# Patient Record
Sex: FEMALE | Race: BLACK OR AFRICAN AMERICAN | Employment: UNEMPLOYED | URBAN - METROPOLITAN AREA
[De-identification: names, ages, dates, MRNs, and addresses within clinical notes are randomized per-mention and may not be internally consistent; named-entity substitution may affect disease eponyms.]

---

## 2020-04-01 PROBLEM — O09.32 LATE PRENATAL CARE IN SECOND TRIMESTER: Status: ACTIVE | Noted: 2020-04-01

## 2020-04-01 LAB
CHLAMYDIA, EXTERNAL: NEGATIVE
HBSAG, EXTERNAL: NEGATIVE
HIV, EXTERNAL: NEGATIVE
N. GONORRHEA, EXTERNAL: NEGATIVE
RPR, EXTERNAL: NORMAL
RUBELLA, EXTERNAL: NORMAL
TYPE, ABO & RH, EXTERNAL: NORMAL

## 2020-04-06 PROBLEM — D58.2 HEMOGLOBIN C TRAIT (HCC): Status: ACTIVE | Noted: 2020-04-06

## 2020-06-25 LAB — GRBS, EXTERNAL: NORMAL

## 2020-06-28 ENCOUNTER — HOSPITAL ENCOUNTER (INPATIENT)
Age: 20
LOS: 2 days | Discharge: HOME | DRG: 560 | End: 2020-06-30
Attending: OBSTETRICS & GYNECOLOGY | Admitting: OBSTETRICS & GYNECOLOGY
Payer: MEDICAID

## 2020-06-28 PROBLEM — O47.9 UTERINE CONTRACTIONS DURING PREGNANCY: Status: ACTIVE | Noted: 2020-06-28

## 2020-06-28 LAB
ABO + RH BLD: NORMAL
ALBUMIN SERPL-MCNC: 3.2 G/DL (ref 3.4–5)
ALBUMIN/GLOB SERPL: 0.7 {RATIO} (ref 0.8–1.7)
ALP SERPL-CCNC: 201 U/L (ref 45–117)
ALT SERPL-CCNC: 42 U/L (ref 13–56)
ANION GAP SERPL CALC-SCNC: 11 MMOL/L (ref 3–18)
APPEARANCE UR: ABNORMAL
APPEARANCE UR: ABNORMAL
AST SERPL-CCNC: 40 U/L (ref 10–38)
BASOPHILS # BLD: 0 K/UL (ref 0–0.1)
BASOPHILS NFR BLD: 0 % (ref 0–2)
BILIRUB SERPL-MCNC: 0.5 MG/DL (ref 0.2–1)
BILIRUB UR QL: NEGATIVE
BILIRUB UR QL: NEGATIVE
BLOOD GROUP ANTIBODIES SERPL: NORMAL
BUN SERPL-MCNC: 10 MG/DL (ref 7–18)
BUN/CREAT SERPL: 18 (ref 12–20)
CALCIUM SERPL-MCNC: 9.5 MG/DL (ref 8.5–10.1)
CHLORIDE SERPL-SCNC: 104 MMOL/L (ref 100–111)
CO2 SERPL-SCNC: 20 MMOL/L (ref 21–32)
COLOR UR: YELLOW
COLOR UR: YELLOW
CREAT SERPL-MCNC: 0.55 MG/DL (ref 0.6–1.3)
DIFFERENTIAL METHOD BLD: ABNORMAL
EOSINOPHIL # BLD: 0 K/UL (ref 0–0.4)
EOSINOPHIL NFR BLD: 0 % (ref 0–5)
ERYTHROCYTE [DISTWIDTH] IN BLOOD BY AUTOMATED COUNT: 14 % (ref 11.6–14.5)
GLOBULIN SER CALC-MCNC: 4.7 G/DL (ref 2–4)
GLUCOSE SERPL-MCNC: 87 MG/DL (ref 74–99)
GLUCOSE UR QL STRIP.AUTO: NEGATIVE MG/DL
GLUCOSE UR QL STRIP.AUTO: NEGATIVE MG/DL
HCT VFR BLD AUTO: 39.5 % (ref 35–45)
HGB BLD-MCNC: 14.2 G/DL (ref 12–16)
KETONES UR-MCNC: NEGATIVE MG/DL
KETONES UR-MCNC: NEGATIVE MG/DL
LDH SERPL L TO P-CCNC: 273 U/L (ref 81–234)
LEUKOCYTE ESTERASE UR QL STRIP: ABNORMAL
LEUKOCYTE ESTERASE UR QL STRIP: ABNORMAL
LYMPHOCYTES # BLD: 1.8 K/UL (ref 0.9–3.6)
LYMPHOCYTES NFR BLD: 10 % (ref 21–52)
MCH RBC QN AUTO: 29.7 PG (ref 24–34)
MCHC RBC AUTO-ENTMCNC: 35.9 G/DL (ref 31–37)
MCV RBC AUTO: 82.6 FL (ref 74–97)
MONOCYTES # BLD: 0.8 K/UL (ref 0.05–1.2)
MONOCYTES NFR BLD: 5 % (ref 3–10)
NEUTS SEG # BLD: 15.4 K/UL (ref 1.8–8)
NEUTS SEG NFR BLD: 85 % (ref 40–73)
NITRITE UR QL: NEGATIVE
NITRITE UR QL: NEGATIVE
PH UR: 7 [PH] (ref 5–9)
PH UR: 7 [PH] (ref 5–9)
PLATELET # BLD AUTO: 298 K/UL (ref 135–420)
PMV BLD AUTO: 11.1 FL (ref 9.2–11.8)
POTASSIUM SERPL-SCNC: 4.2 MMOL/L (ref 3.5–5.5)
PROT SERPL-MCNC: 7.9 G/DL (ref 6.4–8.2)
PROT UR QL: 30 MG/DL
PROT UR QL: 30 MG/DL
RBC # BLD AUTO: 4.78 M/UL (ref 4.2–5.3)
RBC # UR STRIP: ABNORMAL /UL
RBC # UR STRIP: ABNORMAL /UL
SERVICE CMNT-IMP: ABNORMAL
SERVICE CMNT-IMP: ABNORMAL
SODIUM SERPL-SCNC: 135 MMOL/L (ref 136–145)
SP GR UR: 1.02 (ref 1–1.02)
SP GR UR: 1.02 (ref 1–1.02)
SPECIMEN EXP DATE BLD: NORMAL
URATE SERPL-MCNC: 4.8 MG/DL (ref 2.6–7.2)
UROBILINOGEN UR QL: 0.2 EU/DL (ref 0.2–1)
UROBILINOGEN UR QL: 0.2 EU/DL (ref 0.2–1)
WBC # BLD AUTO: 18.1 K/UL (ref 4.6–13.2)

## 2020-06-28 PROCEDURE — 75410000000 HC DELIVERY VAGINAL/SINGLE

## 2020-06-28 PROCEDURE — 74011000258 HC RX REV CODE- 258: Performed by: OBSTETRICS & GYNECOLOGY

## 2020-06-28 PROCEDURE — 80053 COMPREHEN METABOLIC PANEL: CPT

## 2020-06-28 PROCEDURE — 75410000002 HC LABOR FEE PER 1 HR

## 2020-06-28 PROCEDURE — 86900 BLOOD TYPING SEROLOGIC ABO: CPT

## 2020-06-28 PROCEDURE — 81003 URINALYSIS AUTO W/O SCOPE: CPT

## 2020-06-28 PROCEDURE — 83615 LACTATE (LD) (LDH) ENZYME: CPT

## 2020-06-28 PROCEDURE — 84550 ASSAY OF BLOOD/URIC ACID: CPT

## 2020-06-28 PROCEDURE — 4A1HXCZ MONITORING OF PRODUCTS OF CONCEPTION, CARDIAC RATE, EXTERNAL APPROACH: ICD-10-PCS | Performed by: OBSTETRICS & GYNECOLOGY

## 2020-06-28 PROCEDURE — 65270000029 HC RM PRIVATE

## 2020-06-28 PROCEDURE — 74011250637 HC RX REV CODE- 250/637: Performed by: OBSTETRICS & GYNECOLOGY

## 2020-06-28 PROCEDURE — 85025 COMPLETE CBC W/AUTO DIFF WBC: CPT

## 2020-06-28 PROCEDURE — 77030009363 HC CUP VAC EXTRCT CNCI -B

## 2020-06-28 PROCEDURE — 75410000003 HC RECOV DEL/VAG/CSECN EA 0.5 HR

## 2020-06-28 PROCEDURE — 59025 FETAL NON-STRESS TEST: CPT

## 2020-06-28 PROCEDURE — 74011250636 HC RX REV CODE- 250/636: Performed by: OBSTETRICS & GYNECOLOGY

## 2020-06-28 RX ORDER — METHYLERGONOVINE MALEATE 0.2 MG/ML
0.2 INJECTION INTRAVENOUS AS NEEDED
Status: DISCONTINUED | OUTPATIENT
Start: 2020-06-28 | End: 2020-06-28 | Stop reason: HOSPADM

## 2020-06-28 RX ORDER — NALBUPHINE HYDROCHLORIDE 10 MG/ML
10 INJECTION, SOLUTION INTRAMUSCULAR; INTRAVENOUS; SUBCUTANEOUS
Status: DISCONTINUED | OUTPATIENT
Start: 2020-06-28 | End: 2020-06-28

## 2020-06-28 RX ORDER — OXYTOCIN/0.9 % SODIUM CHLORIDE 20/1000 ML
999 PLASTIC BAG, INJECTION (ML) INTRAVENOUS ONCE
Status: DISCONTINUED | OUTPATIENT
Start: 2020-06-28 | End: 2020-06-28

## 2020-06-28 RX ORDER — TERBUTALINE SULFATE 1 MG/ML
0.25 INJECTION SUBCUTANEOUS
Status: DISCONTINUED | OUTPATIENT
Start: 2020-06-28 | End: 2020-06-28 | Stop reason: HOSPADM

## 2020-06-28 RX ORDER — AMOXICILLIN 250 MG
1 CAPSULE ORAL
Status: DISCONTINUED | OUTPATIENT
Start: 2020-06-28 | End: 2020-06-30 | Stop reason: HOSPADM

## 2020-06-28 RX ORDER — PROMETHAZINE HYDROCHLORIDE 25 MG/ML
25 INJECTION, SOLUTION INTRAMUSCULAR; INTRAVENOUS
Status: DISCONTINUED | OUTPATIENT
Start: 2020-06-28 | End: 2020-06-30 | Stop reason: HOSPADM

## 2020-06-28 RX ORDER — LIDOCAINE HYDROCHLORIDE 10 MG/ML
20 INJECTION, SOLUTION EPIDURAL; INFILTRATION; INTRACAUDAL; PERINEURAL AS NEEDED
Status: DISCONTINUED | OUTPATIENT
Start: 2020-06-28 | End: 2020-06-28

## 2020-06-28 RX ORDER — BUTORPHANOL TARTRATE 2 MG/ML
2 INJECTION INTRAMUSCULAR; INTRAVENOUS
Status: DISCONTINUED | OUTPATIENT
Start: 2020-06-28 | End: 2020-06-28

## 2020-06-28 RX ORDER — OXYTOCIN/0.9 % SODIUM CHLORIDE 20/1000 ML
125 PLASTIC BAG, INJECTION (ML) INTRAVENOUS CONTINUOUS
Status: DISCONTINUED | OUTPATIENT
Start: 2020-06-28 | End: 2020-06-28

## 2020-06-28 RX ORDER — SODIUM CHLORIDE, SODIUM LACTATE, POTASSIUM CHLORIDE, CALCIUM CHLORIDE 600; 310; 30; 20 MG/100ML; MG/100ML; MG/100ML; MG/100ML
125 INJECTION, SOLUTION INTRAVENOUS CONTINUOUS
Status: DISCONTINUED | OUTPATIENT
Start: 2020-06-28 | End: 2020-06-28

## 2020-06-28 RX ORDER — ZOLPIDEM TARTRATE 5 MG/1
5 TABLET ORAL
Status: DISCONTINUED | OUTPATIENT
Start: 2020-06-28 | End: 2020-06-30 | Stop reason: HOSPADM

## 2020-06-28 RX ORDER — IBUPROFEN 400 MG/1
800 TABLET ORAL
Status: DISCONTINUED | OUTPATIENT
Start: 2020-06-28 | End: 2020-06-30 | Stop reason: HOSPADM

## 2020-06-28 RX ORDER — MINERAL OIL
30 OIL (ML) ORAL AS NEEDED
Status: COMPLETED | OUTPATIENT
Start: 2020-06-28 | End: 2020-06-28

## 2020-06-28 RX ORDER — ACETAMINOPHEN 325 MG/1
650 TABLET ORAL
Status: DISCONTINUED | OUTPATIENT
Start: 2020-06-28 | End: 2020-06-30 | Stop reason: HOSPADM

## 2020-06-28 RX ORDER — HYDROMORPHONE HYDROCHLORIDE 1 MG/ML
1 INJECTION, SOLUTION INTRAMUSCULAR; INTRAVENOUS; SUBCUTANEOUS
Status: DISCONTINUED | OUTPATIENT
Start: 2020-06-28 | End: 2020-06-28

## 2020-06-28 RX ORDER — OXYCODONE AND ACETAMINOPHEN 5; 325 MG/1; MG/1
2 TABLET ORAL
Status: DISCONTINUED | OUTPATIENT
Start: 2020-06-28 | End: 2020-06-30 | Stop reason: HOSPADM

## 2020-06-28 RX ORDER — ONDANSETRON 2 MG/ML
4 INJECTION INTRAMUSCULAR; INTRAVENOUS
Status: DISCONTINUED | OUTPATIENT
Start: 2020-06-28 | End: 2020-06-28

## 2020-06-28 RX ADMIN — SODIUM CHLORIDE 5 MILLION UNITS: 900 INJECTION INTRAVENOUS at 10:23

## 2020-06-28 RX ADMIN — SODIUM CHLORIDE, SODIUM LACTATE, POTASSIUM CHLORIDE, AND CALCIUM CHLORIDE 125 ML/HR: 600; 310; 30; 20 INJECTION, SOLUTION INTRAVENOUS at 10:23

## 2020-06-28 RX ADMIN — IBUPROFEN 800 MG: 400 TABLET, FILM COATED ORAL at 14:45

## 2020-06-28 RX ADMIN — MINERAL 30 ML: 999 OIL ORAL at 10:23

## 2020-06-28 RX ADMIN — IBUPROFEN 800 MG: 400 TABLET, FILM COATED ORAL at 23:49

## 2020-06-28 NOTE — PROGRESS NOTES
Problem: Pain  Goal: *Control of Pain  6/28/2020 1532 by Delilah MORAN  Outcome: Progressing Towards Goal  6/28/2020 0958 by Ria Witt  Outcome: Progressing Towards Goal     Problem: Patient Education: Go to Patient Education Activity  Goal: Patient/Family Education  6/28/2020 1532 by Ria Witt  Outcome: Progressing Towards Goal  6/28/2020 0958 by Delilah MORAN  Outcome: Progressing Towards Goal     Problem: Vaginal Delivery: Day of Delivery-Post delivery  Goal: Off Pathway (Use only if patient is Off Pathway)  Outcome: Progressing Towards Goal  Goal: Activity/Safety  Outcome: Progressing Towards Goal  Goal: Consults, if ordered  Outcome: Progressing Towards Goal  Goal: Nutrition/Diet  Outcome: Progressing Towards Goal  Goal: Discharge Planning  Outcome: Progressing Towards Goal  Goal: Medications  Outcome: Progressing Towards Goal  Goal: Treatments/Interventions/Procedures  Outcome: Progressing Towards Goal  Goal: *Vital signs within defined limits  Outcome: Progressing Towards Goal  Goal: *Labs within defined limits  Outcome: Progressing Towards Goal  Goal: *Hemodynamically stable  Outcome: Progressing Towards Goal  Goal: *Optimal pain control at patient's stated goal  Outcome: Progressing Towards Goal  Goal: *Participates in infant care  Outcome: Progressing Towards Goal  Goal: *Demonstrates progressive activity  Outcome: Progressing Towards Goal  Goal: *Tolerating diet  Outcome: Progressing Towards Goal

## 2020-06-28 NOTE — PROGRESS NOTES
Problem: Pain  Goal: *Control of Pain  Outcome: Progressing Towards Goal     Problem: Patient Education: Go to Patient Education Activity  Goal: Patient/Family Education  Outcome: Progressing Towards Goal     Problem: Vaginal Delivery: Day of Delivery-Post delivery  Goal: Off Pathway (Use only if patient is Off Pathway)  Outcome: Progressing Towards Goal  Goal: Activity/Safety  Outcome: Progressing Towards Goal  Goal: Consults, if ordered  Outcome: Progressing Towards Goal  Goal: Nutrition/Diet  Outcome: Progressing Towards Goal  Goal: Discharge Planning  Outcome: Progressing Towards Goal  Goal: Medications  Outcome: Progressing Towards Goal  Goal: Treatments/Interventions/Procedures  Outcome: Progressing Towards Goal  Goal: *Vital signs within defined limits  Outcome: Progressing Towards Goal  Goal: *Labs within defined limits  Outcome: Progressing Towards Goal  Goal: *Hemodynamically stable  Outcome: Progressing Towards Goal  Goal: *Optimal pain control at patient's stated goal  Outcome: Progressing Towards Goal  Goal: *Participates in infant care  Outcome: Progressing Towards Goal  Goal: *Demonstrates progressive activity  Outcome: Progressing Towards Goal  Goal: *Tolerating diet  Outcome: Progressing Towards Goal     Problem: Patient Education: Go to Patient Education Activity  Goal: Patient/Family Education  Outcome: Progressing Towards Goal

## 2020-06-28 NOTE — PROGRESS NOTES
Delivery Note    Obstetrician: rodger    Assistant: none    Pre-Delivery Diagnosis:  pregnancy <37 weeks    Post-Delivery Diagnosis: Male    Intrapartum Event: Extended fetal bradycardia and Multiple variable decelerations    Procedure: Spontaneous vaginal delivery    Epidural: NO    Monitor:  Fetal Heart Tones - Internal and Uterine Contractions - External    Indications for instrumental delivery: , bradycardia or . Pt difficult cooperation with pushing and fhr with cx to 70 bpm vacuum applied with 3 pop offs pt refused forceps. fhr inbetween tx with mod variabiity and pt had 3-4 min breaks inbetween therefore pt able to delivery spontaneously on her own.  Ped sin atteendance for delivery as well    Estimated Blood Loss: 200cc    Episiotomy: none    Laceration(s):  none    Laceration(s) repair: NO    Presentation: Cephalic    Fetal Description: fish    Fetal Position: Occiput Anterior    Birth Weight: 4 9    Birth Length:     Apgar - One Minute: 8    Apgar - Five Minutes: 9    Umbilical Cord: Nuchal Cord x  1 and 3 vessels present    Specimens: none           Complications:  As above in note           Cord Blood Results:   Information for the patient[facility] :  [facility][id number][facility]   No results found for: PCTABR, [address], PCTDIG, BILI, ABORH, ABORHEXT    Prenatal Labs:     Lab Results   Component Value Date/Time    ABO/Rh(D) A POSITIVE 2020 09:45 AM    HBsAg, External negative 2020    HIV, External negative 2020    Rubella, External Immune 2020    RPR, External NR 2020    Gonorrhea, External negative 2020    Chlamydia, External negative 2020    GrBStrep, External Unknown 2020        Attending Attestation: I was present and scrubbed for the entire procedure    Signed By:  Salome Sharma MD     2020

## 2020-06-28 NOTE — PROGRESS NOTES
TRANSFER - IN REPORT:     Verbal report given to SANDRO Joseph RN(name) on Alfonso Arm  being transferred to [address] [place] [address](unit) for routine progression of care        Report consisted of patients Situation, Background, Assessment and   Recommendations(SBAR).    Information from the following report(s) SBAR, Intake/Output and Recent Results was reviewed with the receiving nurse. 1915 Bedside and Verbal shift change report given to Miguel Vela RN (oncoming nurse) by Tiera Manzo RN (offgoing nurse). Report included the following information SBAR, Kardex, Procedure Summary, Intake/Output, MAR and Recent Results.

## 2020-06-28 NOTE — PROGRESS NOTES
TRANSFER - OUT REPORT:    Verbal report given to SANDRO Joseph RN(name) on Milagro Gale  being transferred to [address] [place] [address](unit) for routine progression of care       Report consisted of patients Situation, Background, Assessment and   Recommendations(SBAR). Information from the following report(s) SBAR, Intake/Output and Recent Results was reviewed with the receiving nurse. Lines:   Peripheral IV 06/28/20 Left Wrist (Active)        Opportunity for questions and clarification was provided.       Patient transported with:   Registered Nurse

## 2020-06-28 NOTE — PROGRESS NOTES
Pt assisted up the bathroom and back to bed without issue. Pt able to void 500. PT completed yenni-care. New gown placed. New mesh panties placed. Pt denies further needs at this time.

## 2020-06-28 NOTE — H&P
Ostetrical History and Physical    Subjective:     Date of Admission: 2020    Patient is a 23 y.o.  female admitted with labor unsure when rom but pt now complete with hair presenting. 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 Unknown gbs. Late  labor. For Obstetric history, see duane.    History reviewed. No pertinent past medical history. History reviewed. No pertinent surgical history. Prior to Admission medications    Medication Sig Start Date End Date Taking? Authorizing Provider   diphenhydrAMINE (BenadryL) 25 mg capsule Take 25 mg by mouth every six (6) hours as needed. Yes Provider, Historical   prenatal vit 85-iron-FA 1-dha (Prenate Pixie) 10 mg iron- 1 mg-200 mg cap Take 1 Tab by mouth daily. Indications: pregnancy 20  Yes Raffi Briggs NP     No Known Allergies   Social History     Tobacco Use    Smoking status: Never Smoker    Smokeless tobacco: Never Used   Substance Use Topics    Alcohol use: Not Currently      History reviewed. No pertinent family history. Review of Systems    Objective:     Blood pressure 131/88, pulse 90, height 5' 6\" (1.676 m), weight 62.1 kg (137 lb), last menstrual period 10/14/2019, not currently breastfeeding. No data recorded. No intake/output data recorded. No intake/output data recorded.     @[id number]    Pelvic: Mgvhqo17, Effaced:> 50%Station:0  Data Review:   Recent Results (from the past 24 hour(s))   POC URINE MACROSCOPIC    Collection Time: 20  9:36 AM   Result Value Ref Range    Color YELLOW      Appearance SLIGHTLY CLOUDY      Spec. gravity (POC) 1.025 (H) 1.001 - 1.023      pH, urine  (POC) 7.0 5.0 - 9.0      Protein (POC) 30 (A) NEG mg/dL    Glucose, urine (POC) Negative NEG mg/dL    Ketones (POC) Negative NEG mg/dL    Bilirubin (POC) Negative NEG      Blood (POC) MODERATE (A) NEG      Urobilinogen (POC) 0.2 0.2 - 1.0 EU/dL    Nitrite (POC) Negative NEG      Leukocyte esterase (POC) LARGE (A) NEG      Performed by Bettina Cosme    POC URINE MACROSCOPIC    Collection Time: 20  9:41 AM   Result Value Ref Range    Color YELLOW      Appearance SLIGHTLY CLOUDY      Spec. gravity (POC) 1.025 (H) 1.001 - 1.023      pH, urine  (POC) 7.0 5.0 - 9.0      Protein (POC) 30 (A) NEG mg/dL    Glucose, urine (POC) Negative NEG mg/dL    Ketones (POC) Negative NEG mg/dL    Bilirubin (POC) Negative NEG      Blood (POC) MODERATE (A) NEG      Urobilinogen (POC) 0.2 0.2 - 1.0 EU/dL    Nitrite (POC) Negative NEG      Leukocyte esterase (POC) SMALL (A) NEG      Performed by Bettina Cosme    CBC WITH AUTOMATED DIFF    Collection Time: 20  9:45 AM   Result Value Ref Range    WBC 18.1 (H) 4.6 - 13.2 K/uL    RBC 4.78 4.20 - 5.30 M/uL    HGB 14.2 12.0 - 16.0 g/dL    HCT 39.5 35.0 - 45.0 %    MCV 82.6 74.0 - 97.0 FL    MCH 29.7 24.0 - 34.0 PG    MCHC 35.9 31.0 - 37.0 g/dL    RDW 14.0 11.6 - 14.5 %    PLATELET 897 454 - 838 K/uL    MPV 11.1 9.2 - 11.8 FL    NEUTROPHILS 85 (H) 40 - 73 %    LYMPHOCYTES 10 (L) 21 - 52 %    MONOCYTES 5 3 - 10 %    EOSINOPHILS 0 0 - 5 %    BASOPHILS 0 0 - 2 %    ABS. NEUTROPHILS 15.4 (H) 1.8 - 8.0 K/UL    ABS. LYMPHOCYTES 1.8 0.9 - 3.6 K/UL    ABS. MONOCYTES 0.8 0.05 - 1.2 K/UL    ABS. EOSINOPHILS 0.0 0.0 - 0.4 K/UL    ABS.  BASOPHILS 0.0 0.0 - 0.1 K/UL    DF AUTOMATED     TYPE & SCREEN    Collection Time: 20  9:45 AM   Result Value Ref Range    Crossmatch Expiration 2020     ABO/Rh(D) A POSITIVE     Antibody screen NEG      Monitor:  Reactivity:present Variability:present Baseline:normal with mod variabiity but baby with variables to 90 bpm with ctx that recover after    Assessment:late  labor      Active Problems:    Uterine contractions during pregnancy (2020)        Plan:delivery     Prophylaxis:  Deep Vein Thrombosis Protocol Active:Yes    Check labs:    Check  Prenatal:    Disposition    Total time spent with patient:In-Patient    Signed By: Shahnaz Solis MD                         June 28, 2020

## 2020-06-29 LAB
HCT VFR BLD AUTO: 34.2 % (ref 35–45)
HGB BLD-MCNC: 12.3 G/DL (ref 12–16)

## 2020-06-29 PROCEDURE — 74011250637 HC RX REV CODE- 250/637: Performed by: OBSTETRICS & GYNECOLOGY

## 2020-06-29 PROCEDURE — 36415 COLL VENOUS BLD VENIPUNCTURE: CPT

## 2020-06-29 PROCEDURE — 85018 HEMOGLOBIN: CPT

## 2020-06-29 PROCEDURE — 65270000029 HC RM PRIVATE

## 2020-06-29 PROCEDURE — 85014 HEMATOCRIT: CPT

## 2020-06-29 RX ADMIN — IBUPROFEN 800 MG: 400 TABLET, FILM COATED ORAL at 08:25

## 2020-06-29 RX ADMIN — IBUPROFEN 800 MG: 400 TABLET, FILM COATED ORAL at 17:49

## 2020-06-29 NOTE — PROGRESS NOTES
Post-Partum Day Number 1 Progress Note    Asmita Yvon     Assessment:   Hospital Problems  Date Reviewed: 2020          Codes Class Noted POA    * (Principal) Uterine contractions during pregnancy ICD-10-CM: O62.2  ICD-9-CM: 661.20  2020 Yes        Spontaneous vaginal delivery ICD-10-CM: O80  ICD-9-CM: 584  2020 No            Doing well, post partum day 1    Plan:  1. Continue routine postpartum and perineal care as well as maternal education. 2. The risks and benefits of the circumcision  procedure and anesthesia including: bleeding, infection, variability of cosmetic results were discussed at length with the mother. She is aware that future repeat procedures may be necessary. She gives informed consent to proceed as noted and her questions are answered. Information for the patient's :  Esau Garcia [[id number]]   Vaginal, Spontaneous   Patient doing well without significant complaint. Voiding without difficulty, normal lochia. Patient is trying to breast feed. Current Facility-Administered Medications   Medication Dose Route Frequency       Vitals:  Visit Vitals  /71 (BP 1 Location: Left arm, BP Patient Position: At rest;Head of bed elevated (Comment degrees))   Pulse 85   Temp 97.5 °F (36.4 °C)   Resp 18   Ht 5' 6\" (1.676 m)   Wt 137 lb (62.1 kg)   LMP 10/14/2019 (Approximate)   SpO2 99%   Breastfeeding Unknown   BMI 22.11 kg/m²     Temp (24hrs), Av.9 °F (36.6 °C), Min:97.5 °F (36.4 °C), Max:98.3 °F (36.8 °C)        Exam:   Patient without distress. Abdomen soft, fundus firm, nontender                Perineum with normal lochia noted. Lower extremities are negative for swelling, cords or tenderness.     Labs:     Lab Results   Component Value Date/Time    WBC 18.1 (H) 2020 09:45 AM    WBC 9.4 2020 01:19 PM    HGB 12.3 2020 04:20 AM    HGB 14.2 2020 09:45 AM    HGB 13.7 2020 03:11 PM    HCT 34.2 (L) 2020 04:20 AM    HCT 39.5 06/28/2020 09:45 AM    HCT 37.2 04/01/2020 01:19 PM    PLATELET 181 09/19/1745 09:45 AM    PLATELET 813 02/62/4129 01:19 PM       Recent Results (from the past 24 hour(s))   POC URINE MACROSCOPIC    Collection Time: 06/28/20  9:36 AM   Result Value Ref Range    Color YELLOW      Appearance SLIGHTLY CLOUDY      Spec. gravity (POC) 1.025 (H) 1.001 - 1.023      pH, urine  (POC) 7.0 5.0 - 9.0      Protein (POC) 30 (A) NEG mg/dL    Glucose, urine (POC) Negative NEG mg/dL    Ketones (POC) Negative NEG mg/dL    Bilirubin (POC) Negative NEG      Blood (POC) MODERATE (A) NEG      Urobilinogen (POC) 0.2 0.2 - 1.0 EU/dL    Nitrite (POC) Negative NEG      Leukocyte esterase (POC) LARGE (A) NEG      Performed by Ernie Cosme    POC URINE MACROSCOPIC    Collection Time: 06/28/20  9:41 AM   Result Value Ref Range    Color YELLOW      Appearance SLIGHTLY CLOUDY      Spec. gravity (POC) 1.025 (H) 1.001 - 1.023      pH, urine  (POC) 7.0 5.0 - 9.0      Protein (POC) 30 (A) NEG mg/dL    Glucose, urine (POC) Negative NEG mg/dL    Ketones (POC) Negative NEG mg/dL    Bilirubin (POC) Negative NEG      Blood (POC) MODERATE (A) NEG      Urobilinogen (POC) 0.2 0.2 - 1.0 EU/dL    Nitrite (POC) Negative NEG      Leukocyte esterase (POC) SMALL (A) NEG      Performed by Ernie Cosme    CBC WITH AUTOMATED DIFF    Collection Time: 06/28/20  9:45 AM   Result Value Ref Range    WBC 18.1 (H) 4.6 - 13.2 K/uL    RBC 4.78 4.20 - 5.30 M/uL    HGB 14.2 12.0 - 16.0 g/dL    HCT 39.5 35.0 - 45.0 %    MCV 82.6 74.0 - 97.0 FL    MCH 29.7 24.0 - 34.0 PG    MCHC 35.9 31.0 - 37.0 g/dL    RDW 14.0 11.6 - 14.5 %    PLATELET 372 272 - 413 K/uL    MPV 11.1 9.2 - 11.8 FL    NEUTROPHILS 85 (H) 40 - 73 %    LYMPHOCYTES 10 (L) 21 - 52 %    MONOCYTES 5 3 - 10 %    EOSINOPHILS 0 0 - 5 %    BASOPHILS 0 0 - 2 %    ABS. NEUTROPHILS 15.4 (H) 1.8 - 8.0 K/UL    ABS. LYMPHOCYTES 1.8 0.9 - 3.6 K/UL    ABS. MONOCYTES 0.8 0.05 - 1.2 K/UL    ABS. EOSINOPHILS 0.0 0.0 - 0.4 K/UL    ABS. BASOPHILS 0.0 0.0 - 0.1 K/UL    DF AUTOMATED     TYPE & SCREEN    Collection Time: 06/28/20  9:45 AM   Result Value Ref Range    Crossmatch Expiration 07/01/2020     ABO/Rh(D) A POSITIVE     Antibody screen NEG    METABOLIC PANEL, COMPREHENSIVE    Collection Time: 06/28/20  9:45 AM   Result Value Ref Range    Sodium 135 (L) 136 - 145 mmol/L    Potassium 4.2 3.5 - 5.5 mmol/L    Chloride 104 100 - 111 mmol/L    CO2 20 (L) 21 - 32 mmol/L    Anion gap 11 3.0 - 18 mmol/L    Glucose 87 74 - 99 mg/dL    BUN 10 7.0 - 18 MG/DL    Creatinine 0.55 (L) 0.6 - 1.3 MG/DL    BUN/Creatinine ratio 18 12 - 20      GFR est AA >60 >60 ml/min/1.73m2    GFR est non-AA >60 >60 ml/min/1.73m2    Calcium 9.5 8.5 - 10.1 MG/DL    Bilirubin, total 0.5 0.2 - 1.0 MG/DL    ALT (SGPT) 42 13 - 56 U/L    AST (SGOT) 40 (H) 10 - 38 U/L    Alk.  phosphatase 201 (H) 45 - 117 U/L    Protein, total 7.9 6.4 - 8.2 g/dL    Albumin 3.2 (L) 3.4 - 5.0 g/dL    Globulin 4.7 (H) 2.0 - 4.0 g/dL    A-G Ratio 0.7 (L) 0.8 - 1.7     LD    Collection Time: 06/28/20  9:45 AM   Result Value Ref Range     (H) 81 - 234 U/L   URIC ACID    Collection Time: 06/28/20  9:45 AM   Result Value Ref Range    Uric acid 4.8 2.6 - 7.2 MG/DL   HEMATOCRIT    Collection Time: 06/29/20  4:20 AM   Result Value Ref Range    HCT 34.2 (L) 35.0 - 45.0 %   HEMOGLOBIN    Collection Time: 06/29/20  4:20 AM   Result Value Ref Range    HGB 12.3 12.0 - 16.0 g/dL

## 2020-06-29 NOTE — PROGRESS NOTES
Problem: Pain  Goal: *Control of Pain  Outcome: Progressing Towards Goal     Problem: Patient Education: Go to Patient Education Activity  Goal: Patient/Family Education  Outcome: Progressing Towards Goal     Problem: Vaginal Delivery: Day of Delivery-Post delivery  Goal: Off Pathway (Use only if patient is Off Pathway)  Outcome: Progressing Towards Goal  Goal: Activity/Safety  Outcome: Progressing Towards Goal  Goal: Consults, if ordered  Outcome: Progressing Towards Goal  Goal: Nutrition/Diet  Outcome: Progressing Towards Goal  Goal: Discharge Planning  Outcome: Progressing Towards Goal  Goal: Medications  Outcome: Progressing Towards Goal  Goal: Treatments/Interventions/Procedures  Outcome: Progressing Towards Goal  Goal: *Vital signs within defined limits  Outcome: Progressing Towards Goal  Goal: *Labs within defined limits  Outcome: Progressing Towards Goal  Goal: *Hemodynamically stable  Outcome: Progressing Towards Goal  Goal: *Optimal pain control at patient's stated goal  Outcome: Progressing Towards Goal  Goal: *Participates in infant care  Outcome: Progressing Towards Goal  Goal: *Demonstrates progressive activity  Outcome: Progressing Towards Goal  Goal: *Tolerating diet  Outcome: Progressing Towards Goal     Problem: Patient Education: Go to Patient Education Activity  Goal: Patient/Family Education  Outcome: Progressing Towards Goal     Problem: Vaginal Delivery: Day of Delivery-Post delivery  Goal: Off Pathway (Use only if patient is Off Pathway)  Outcome: Progressing Towards Goal  Goal: Activity/Safety  Outcome: Progressing Towards Goal  Goal: Consults, if ordered  Outcome: Progressing Towards Goal  Goal: Nutrition/Diet  Outcome: Progressing Towards Goal  Goal: Discharge Planning  Outcome: Progressing Towards Goal  Goal: Medications  Outcome: Progressing Towards Goal  Goal: Treatments/Interventions/Procedures  Outcome: Progressing Towards Goal  Goal: *Vital signs within defined limits  Outcome: Progressing Towards Goal  Goal: *Labs within defined limits  Outcome: Progressing Towards Goal  Goal: *Hemodynamically stable  Outcome: Progressing Towards Goal  Goal: *Optimal pain control at patient's stated goal  Outcome: Progressing Towards Goal  Goal: *Participates in infant care  Outcome: Progressing Towards Goal  Goal: *Demonstrates progressive activity  Outcome: Progressing Towards Goal  Goal: *Tolerating diet  Outcome: Progressing Towards Goal     Problem: Vaginal Delivery: Postpartum Day 1  Goal: Activity/Safety  Outcome: Progressing Towards Goal  Goal: Consults, if ordered  Outcome: Progressing Towards Goal  Goal: Diagnostic Test/Procedures  Outcome: Progressing Towards Goal  Goal: Nutrition/Diet  Outcome: Progressing Towards Goal  Goal: Discharge Planning  Outcome: Progressing Towards Goal  Goal: Medications  Outcome: Progressing Towards Goal  Goal: Treatments/Interventions/Procedures  Outcome: Progressing Towards Goal  Goal: Psychosocial  Outcome: Progressing Towards Goal  Goal: *Vital signs within defined limits  Outcome: Progressing Towards Goal  Goal: *Labs within defined limits  Outcome: Progressing Towards Goal  Goal: *Hemodynamically stable  Outcome: Progressing Towards Goal  Goal: *Optimal pain control at patient's stated goal  Outcome: Progressing Towards Goal  Goal: *Participates in infant care  Outcome: Progressing Towards Goal  Goal: *Appropriate parent-infant bonding  Outcome: Progressing Towards Goal  Goal: *Tolerating diet  Outcome: Progressing Towards Goal  Goal: *Performs self breast care  Outcome: Progressing Towards Goal     Problem: Vaginal Delivery: Postpartum Day 1  Goal: Activity/Safety  Outcome: Progressing Towards Goal  Goal: Consults, if ordered  Outcome: Progressing Towards Goal  Goal: Diagnostic Test/Procedures  Outcome: Progressing Towards Goal  Goal: Nutrition/Diet  Outcome: Progressing Towards Goal  Goal: Discharge Planning  Outcome: Progressing Towards Goal  Goal: Medications  Outcome: Progressing Towards Goal  Goal: Treatments/Interventions/Procedures  Outcome: Progressing Towards Goal  Goal: Psychosocial  Outcome: Progressing Towards Goal  Goal: *Vital signs within defined limits  Outcome: Progressing Towards Goal  Goal: *Labs within defined limits  Outcome: Progressing Towards Goal  Goal: *Hemodynamically stable  Outcome: Progressing Towards Goal  Goal: *Optimal pain control at patient's stated goal  Outcome: Progressing Towards Goal  Goal: *Participates in infant care  Outcome: Progressing Towards Goal  Goal: *Demonstrates progressive activity  Outcome: Progressing Towards Goal  Goal: *Performs self perineal care  Outcome: Progressing Towards Goal  Goal: *Appropriate parent-infant bonding  Outcome: Progressing Towards Goal  Goal: *Tolerating diet  Outcome: Progressing Towards Goal  Goal: *Performs self breast care  Outcome: Progressing Towards Goal

## 2020-06-29 NOTE — ROUTINE PROCESS
1915 Bedside report received from Vida Callahan . Pt. Stable. Needs addressed. Callbell within reach. 2127 Pt. Joined at bedside by baby. AAOx4. Pain 0/10. Educated on pain management. Whiteboard updated. Educated on plan of care and signs and symptoms to report. No further questions on concerns at this time. Fundus firm at U U-1, scant rubra lochia. No clots noted. Assessment complete. Callbell within reach. Bed in lowest position. 2347 Pt pain rated 5/10. Pain medication administered as ordered. 0225 Rounding complete. Needs addressed. No further concerns expressed. 0542 Rounding complete Pt resting comfortably. 330 Bedside and Verbal shift change report given to Marizol Santizo RN  (oncoming nurse) by BEBETO Mendoza (offgoing nurse). Report included the following information SBAR, Kardex, Intake/Output, MAR and Recent Results.

## 2020-06-29 NOTE — PROGRESS NOTES
Received bedside report from VIANCA WONG using MAR, OB Kardex, labs and OB summary. Assumed care of mom and baby. Pain level assessed with patient pain goals established. Plan of care for today reviewed and opportunity for questions given and questions answered. Discharge process reviewed and patient verbalizes good understanding.

## 2020-06-29 NOTE — PROGRESS NOTES
Problem: Pain  Goal: *Control of Pain  Outcome: Progressing Towards Goal     Problem: Patient Education: Go to Patient Education Activity  Goal: Patient/Family Education  Outcome: Progressing Towards Goal     Problem: Vaginal Delivery: Postpartum Day 1  Goal: Activity/Safety  Outcome: Progressing Towards Goal  Goal: Diagnostic Test/Procedures  Outcome: Progressing Towards Goal  Goal: Nutrition/Diet  Outcome: Progressing Towards Goal  Goal: Discharge Planning  Outcome: Progressing Towards Goal  Goal: Treatments/Interventions/Procedures  Outcome: Progressing Towards Goal  Goal: *Vital signs within defined limits  Outcome: Progressing Towards Goal  Goal: *Labs within defined limits  Outcome: Progressing Towards Goal  Goal: *Hemodynamically stable  Outcome: Progressing Towards Goal  Goal: *Optimal pain control at patient's stated goal  Outcome: Progressing Towards Goal  Goal: *Participates in infant care  Outcome: Progressing Towards Goal  Goal: *Appropriate parent-infant bonding  Outcome: Progressing Towards Goal

## 2020-06-29 NOTE — PROGRESS NOTES
Bedside and Verbal shift change report given to LIDIA Wong RN  by Marcus Mckoy RN . Report given with Dominik COTO and MAR.

## 2020-06-30 VITALS
OXYGEN SATURATION: 98 % | BODY MASS INDEX: 22.02 KG/M2 | DIASTOLIC BLOOD PRESSURE: 85 MMHG | WEIGHT: 137 LBS | SYSTOLIC BLOOD PRESSURE: 133 MMHG | TEMPERATURE: 98.2 F | HEIGHT: 66 IN | RESPIRATION RATE: 18 BRPM | HEART RATE: 100 BPM

## 2020-06-30 PROBLEM — O47.9 UTERINE CONTRACTIONS DURING PREGNANCY: Status: RESOLVED | Noted: 2020-06-28 | Resolved: 2020-06-30

## 2020-06-30 PROCEDURE — 74011250637 HC RX REV CODE- 250/637: Performed by: OBSTETRICS & GYNECOLOGY

## 2020-06-30 RX ORDER — IBUPROFEN 800 MG/1
800 TABLET ORAL
Qty: 30 TAB | Refills: 1 | Status: ON HOLD | OUTPATIENT
Start: 2020-06-30 | End: 2022-03-26

## 2020-06-30 RX ADMIN — IBUPROFEN 800 MG: 400 TABLET, FILM COATED ORAL at 03:44

## 2020-06-30 NOTE — DISCHARGE SUMMARY
Obstetrical Discharge Summary     Name: Carlos Cruz MRN: [id number]  SSN: [id number]    YOB: 2000  Age: 23 y.o. Sex: female      Admit Date: 2020    Discharge Date: 2020    Admitting Physician: Zen Ruiz MD     Attending Physician:  Janny Gasca MD     Discharge Diagnoses:   Information for the patient's :  Jigar Anne [[id number]]   Delivery of a 4 lb 9 oz (2.07 kg) male infant via Vaginal, Spontaneous on 2020 at 11:59 AM  by Denisse Rossi. Apgars were 8  and 9 . 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  ICD-9-CM: 282.7  2020 - Present    Overview Addendum 2020  1:27 PM by Franky Wolfe NP     Heterozygous   FOB can't be tested - not in picture               Late prenatal care in second trimester ICD-10-CM: O09.32  ICD-9-CM: V23.7  2020 - Present    Overview Addendum 2020  1:32 PM by Janny Gasca MD     20 Male fetus, EFW 86%             * (Principal) RESOLVED: Uterine contractions during pregnancy ICD-10-CM: O62.2  ICD-9-CM: 661.20  2020 - 2020              Lab Results   Component Value Date/Time    Rubella, External Immune 2020    GrBStrep, External Unknown 2020     Recent Labs     20  0420   HGB 12.3       Hospital Course: Normal hospital course following the delivery. Patient Instructions:   Current Discharge Medication List      START taking these medications    Details   ibuprofen (MOTRIN) 800 mg tablet Take 1 Tab by mouth every eight (8) hours as needed for Pain. Indications: pain  Qty: 30 Tab, Refills: 1         CONTINUE these medications which have NOT CHANGED    Details   diphenhydrAMINE (BenadryL) 25 mg capsule Take 25 mg by mouth every six (6) hours as needed.       prenatal vit 85-iron-FA 1-dha (Prenate Pixie) 10 mg iron- 1 mg-200 mg cap Take 1 Tab by mouth daily. Indications: pregnancy  Qty: 90 Cap, Refills: 2    Associated Diagnoses: Supervision of high-risk pregnancy, second trimester             Reference my discharge instructions.     Follow-up Appointments   Procedures    FOLLOW UP VISIT Appointment in: 6 Weeks     Standing Status:   Standing     Number of Occurrences:   1     Order Specific Question:   Appointment in     Answer:   6 Weeks        Signed By:  Tasia Krabbe, MD     June 30, 2020                       BST

## 2020-06-30 NOTE — PROGRESS NOTES
Problem: Vaginal Delivery: Postpartum Day 1  Goal: Activity/Safety  Outcome: Progressing Towards Goal  Goal: Consults, if ordered  Outcome: Progressing Towards Goal  Goal: Diagnostic Test/Procedures  Outcome: Progressing Towards Goal  Goal: Nutrition/Diet  Outcome: Progressing Towards Goal  Goal: Discharge Planning  Outcome: Progressing Towards Goal  Goal: Medications  Outcome: Progressing Towards Goal  Goal: Treatments/Interventions/Procedures  Outcome: Progressing Towards Goal  Goal: Psychosocial  Outcome: Progressing Towards Goal  Goal: *Vital signs within defined limits  Outcome: Progressing Towards Goal  Goal: *Labs within defined limits  Outcome: Progressing Towards Goal  Goal: *Hemodynamically stable  Outcome: Progressing Towards Goal  Goal: *Optimal pain control at patient's stated goal  Outcome: Progressing Towards Goal  Goal: *Participates in infant care  Outcome: Progressing Towards Goal  Goal: *Appropriate parent-infant bonding  Outcome: Progressing Towards Goal  Goal: *Tolerating diet  Outcome: Progressing Towards Goal  Goal: *Performs self breast care  Outcome: Progressing Towards Goal

## 2020-06-30 NOTE — LACTATION NOTE
This note was copied from a baby's chart. 26 Mom educated on breastfeeding basics--hunger cues, feeding on demand, waking baby if baby sleeps too long between feeds, importance of skin to skin, positioning and latching, risk of pacifier use and supplemental feedings, and importance of rooming in--and use of log sheet. Mom also educated on benefits of breastfeeding for herself and baby. Mom verbalized understanding. No questions at this time. Per mom, infant latching and nursing well. Henrietta was displaying hunger cues, mom latched  for several minutes at 0. DOL expectations were discussed. Encouraged mom to begin each feeding at the breast, discussed supply and demand. Mom verbalized understanding.

## 2020-06-30 NOTE — PROGRESS NOTES
Problem: Vaginal Delivery: Day of Delivery-Post delivery  Goal: Off Pathway (Use only if patient is Off Pathway)  Outcome: Progressing Towards Goal  Goal: Activity/Safety  Outcome: Progressing Towards Goal  Goal: Consults, if ordered  Outcome: Progressing Towards Goal  Goal: Nutrition/Diet  Outcome: Progressing Towards Goal  Goal: Discharge Planning  Outcome: Progressing Towards Goal  Goal: Medications  Outcome: Progressing Towards Goal  Goal: Treatments/Interventions/Procedures  Outcome: Progressing Towards Goal  Goal: *Vital signs within defined limits  Outcome: Progressing Towards Goal  Goal: *Labs within defined limits  Outcome: Progressing Towards Goal  Goal: *Hemodynamically stable  Outcome: Progressing Towards Goal  Goal: *Optimal pain control at patient's stated goal  Outcome: Progressing Towards Goal  Goal: *Participates in infant care  Outcome: Progressing Towards Goal  Goal: *Demonstrates progressive activity  Outcome: Progressing Towards Goal  Goal: *Tolerating diet  Outcome: Progressing Towards Goal     Problem: Patient Education: Go to Patient Education Activity  Goal: Patient/Family Education  Outcome: Progressing Towards Goal     Problem: Vaginal Delivery: Day of Delivery-Post delivery  Goal: Off Pathway (Use only if patient is Off Pathway)  Outcome: Progressing Towards Goal  Goal: Activity/Safety  Outcome: Progressing Towards Goal  Goal: Consults, if ordered  Outcome: Progressing Towards Goal  Goal: Nutrition/Diet  Outcome: Progressing Towards Goal  Goal: Discharge Planning  Outcome: Progressing Towards Goal  Goal: Medications  Outcome: Progressing Towards Goal  Goal: Treatments/Interventions/Procedures  Outcome: Progressing Towards Goal  Goal: *Vital signs within defined limits  Outcome: Progressing Towards Goal  Goal: *Labs within defined limits  Outcome: Progressing Towards Goal  Goal: *Hemodynamically stable  Outcome: Progressing Towards Goal  Goal: *Optimal pain control at patient's stated goal  Outcome: Progressing Towards Goal  Goal: *Participates in infant care  Outcome: Progressing Towards Goal  Goal: *Demonstrates progressive activity  Outcome: Progressing Towards Goal  Goal: *Tolerating diet  Outcome: Progressing Towards Goal     Problem: Vaginal Delivery: Postpartum Day 1  Goal: Activity/Safety  Outcome: Progressing Towards Goal  Goal: Consults, if ordered  Outcome: Progressing Towards Goal  Goal: Diagnostic Test/Procedures  Outcome: Progressing Towards Goal  Goal: Nutrition/Diet  Outcome: Progressing Towards Goal  Goal: Discharge Planning  Outcome: Progressing Towards Goal  Goal: Medications  Outcome: Progressing Towards Goal  Goal: Treatments/Interventions/Procedures  Outcome: Progressing Towards Goal  Goal: Psychosocial  Outcome: Progressing Towards Goal  Goal: *Vital signs within defined limits  Outcome: Progressing Towards Goal  Goal: *Labs within defined limits  Outcome: Progressing Towards Goal  Goal: *Hemodynamically stable  Outcome: Progressing Towards Goal  Goal: *Optimal pain control at patient's stated goal  Outcome: Progressing Towards Goal  Goal: *Participates in infant care  Outcome: Progressing Towards Goal  Goal: *Appropriate parent-infant bonding  Outcome: Progressing Towards Goal  Goal: *Tolerating diet  Outcome: Progressing Towards Goal  Goal: *Performs self breast care  Outcome: Progressing Towards Goal     Problem: Vaginal Delivery: Postpartum Day 1  Goal: Activity/Safety  Outcome: Progressing Towards Goal  Goal: Consults, if ordered  Outcome: Progressing Towards Goal  Goal: Diagnostic Test/Procedures  Outcome: Progressing Towards Goal  Goal: Nutrition/Diet  Outcome: Progressing Towards Goal  Goal: Discharge Planning  Outcome: Progressing Towards Goal  Goal: Medications  Outcome: Progressing Towards Goal  Goal: Treatments/Interventions/Procedures  Outcome: Progressing Towards Goal  Goal: Psychosocial  Outcome: Progressing Towards Goal  Goal: *Vital signs within defined limits  Outcome: Progressing Towards Goal  Goal: *Labs within defined limits  Outcome: Progressing Towards Goal  Goal: *Hemodynamically stable  Outcome: Progressing Towards Goal  Goal: *Optimal pain control at patient's stated goal  Outcome: Progressing Towards Goal  Goal: *Participates in infant care  Outcome: Progressing Towards Goal  Goal: *Demonstrates progressive activity  Outcome: Progressing Towards Goal  Goal: *Performs self perineal care  Outcome: Progressing Towards Goal  Goal: *Appropriate parent-infant bonding  Outcome: Progressing Towards Goal  Goal: *Tolerating diet  Outcome: Progressing Towards Goal  Goal: *Performs self breast care  Outcome: Progressing Towards Goal     Problem: Vaginal Delivery: Postpartum 2  Goal: Activity/Safety  Outcome: Progressing Towards Goal  Goal: Consults, if ordered  Outcome: Progressing Towards Goal  Goal: Nutrition/Diet  Outcome: Progressing Towards Goal  Goal: Discharge Planning  Outcome: Progressing Towards Goal  Goal: Medications  Outcome: Progressing Towards Goal  Goal: Treatments/Interventions/Procedures  Outcome: Progressing Towards Goal  Goal: Psychosocial  Outcome: Progressing Towards Goal     Problem: Vaginal Delivery: Discharge Outcomes  Goal: *Verbalizes name, dosage, time, side effects, and number of days to continue medications  Outcome: Progressing Towards Goal  Goal: *Describes available resources and support systems  Outcome: Progressing Towards Goal  Goal: *No signs and symptoms of infection  Outcome: Progressing Towards Goal  Goal: *Birth certificate information completed  Outcome: Progressing Towards Goal  Goal: *Received and verbalizes understanding of discharge plan and instructions  Outcome: Progressing Towards Goal  Goal: *Vital signs within defined limits  Outcome: Progressing Towards Goal  Goal: *Labs within defined limits  Outcome: Progressing Towards Goal  Goal: *Hemodynamically stable  Outcome: Progressing Towards Goal  Goal: *Optimal pain control at patient's stated goal  Outcome: Progressing Towards Goal  Goal: *Participates in infant care  Outcome: Progressing Towards Goal  Goal: *Demonstrates progressive activity  Outcome: Progressing Towards Goal  Goal: *Appropriate parent-infant bonding  Outcome: Progressing Towards Goal  Goal: *Tolerating diet  Outcome: Progressing Towards Goal

## 2020-06-30 NOTE — DISCHARGE INSTRUCTIONS
POST DELIVERY DISCHARGE INSTRUCTIONS    Name: Irene Market  YOB: 2000  Primary Diagnosis: Active Problems:    Spontaneous vaginal delivery (6/28/2020)        General:     Diet/Diet Restrictions:  Eight 8-ounce glasses of fluid daily (water, juices); avoid excessive caffeine intake. Meals/snacks as desired which are high in fiber and carbohydrates and low in fat and cholesterol. Physical Activity / Restrictions / Safety:       Avoid intercourse 4-6 weeks, no douching or tampon use. Check with obstetrician before starting or resuming an exercise program.         Discharge Instructions/Special Treatment/Home Care Needs:     Continue prenatal vitamins. Continue to use squirt bottle with warm water on your episiotomy after each bathroom use until bleeding stops. Call your doctor for the following:     Fever over 100.4 degrees by mouth. Vaginal bleeding heavier than a normal menstrual period or clot larger than a golf ball. Red streaks or increased swelling of legs, painful red streaks on your breast.  Painful urination, constipation and increased pain or swelling or discharge with your incision. If you feel extremely anxious or overwhelmed. If you have thoughts of harming yourself and/or your baby. Pain Management:     Pain Management:   Take Acetaminophen (Tylenol) or Ibuprofen (Advil, Motrin), as directed for pain. Use a warm Sitz bath 3 times daily to relieve episiotomy or hemorrhoidal discomfort. For hemorrhoidal discomfort, use Tucks and Anusol cream as needed and directed. Follow-Up Care:     Call Dr Salomon Hodges to make appointment for your 6 weeks follow up. These are general instructions for a healthy lifestyle:    No smoking/ No tobacco products/ Avoid exposure to second hand smoke    Surgeon General's Warning:  Quitting smoking now greatly reduces serious risk to your health.     Obesity, smoking, and sedentary lifestyle greatly increases your risk for illness    A healthy diet, regular physical exercise & weight monitoring are important for maintaining a healthy lifestyle    Recognize signs and symptoms of STROKE:    F-face looks uneven    A-arms unable to move or move unevenly    S-speech slurred or non-existent    T-time-call 911 as soon as signs and symptoms begin-DO NOT go       Back to bed or wait to see if you get better-TIME IS BRAIN.     Patient armband removed and shredded

## 2020-06-30 NOTE — PROGRESS NOTES
1920- Bedside shift change report given to Rosalia Cisneros RN (oncoming nurse) by Hung Trivedi RN (offgoing nurse). Report included the following information SBAR, Kardex, Intake/Output, MAR and Recent Results. 0710- Bedside shift change report given to Hung Trivedi RN, RN (oncoming nurse) by Rosalia Cisneros RN   (offgoing nurse). Report included the following information SBAR, Kardex, Intake/Output, MAR and Recent Results.

## 2020-06-30 NOTE — PROGRESS NOTES
Post-Partum Day Number 2 Progress Note    Asmita Lyntt     Assessment:   Hospital Problems  Date Reviewed: 2020          Codes Class Noted POA    * (Principal) Uterine contractions during pregnancy ICD-10-CM: O62.2  ICD-9-CM: 661.20  2020 Yes        Spontaneous vaginal delivery ICD-10-CM: O80  ICD-9-CM: 264  2020 No            Doing well, post partum day 2    Plan:   1. Discharge home today  2. Follow up in office in 6 weeks with Abner Rojas MD   3. Post partum activity advised, diet as tolerated  4. Discharge Medications: ibuprofen and medications prior to admission    Information for the patient's :  Jalen Navarro [[id number]]   Vaginal, Spontaneous   Patient doing well without significant complaint. Voiding without difficulty, normal lochia. Ready to go home. Would like ibuprofen for discharge. Current Facility-Administered Medications   Medication Dose Route Frequency       Vitals:  Visit Vitals  /78 (BP 1 Location: Right arm, BP Patient Position: At rest)   Pulse 77   Temp 98.2 °F (36.8 °C)   Resp 17   Ht 5' 6\" (1.676 m)   Wt 137 lb (62.1 kg)   LMP 10/14/2019 (Approximate)   SpO2 98%   Breastfeeding Unknown   BMI 22.11 kg/m²     Temp (24hrs), Av °F (36.7 °C), Min:97.5 °F (36.4 °C), Max:98.4 °F (36.9 °C)      Exam:         Patient without distress. Abdomen soft, fundus firm, nontender                 Lower extremities are negative for swelling, cords or tenderness. Labs:     Lab Results   Component Value Date/Time    WBC 18.1 (H) 2020 09:45 AM    WBC 9.4 2020 01:19 PM    HGB 12.3 2020 04:20 AM    HGB 14.2 2020 09:45 AM    HGB 13.7 2020 03:11 PM    HCT 34.2 (L) 2020 04:20 AM    HCT 39.5 2020 09:45 AM    HCT 37.2 2020 01:19 PM    PLATELET 319  09:45 AM    PLATELET 526  01:19 PM       No results found for this or any previous visit (from the past 24 hour(s)).

## 2020-06-30 NOTE — PROGRESS NOTES
Received bedside report from LIDIA Trevino RN using MAR, OB Kardex, labs and OB summary. Assumed care of mom and baby. Pain level assessed with patient pain goals established. Plan of care for today reviewed and opportunity for questions given and questions answered. Discharge process reviewed and patient verbalizes good understanding.

## 2021-09-16 LAB
CHLAMYDIA, EXTERNAL: NEGATIVE
HBSAG, EXTERNAL: NEGATIVE
HIV, EXTERNAL: NON REACTIVE
N. GONORRHEA, EXTERNAL: NEGATIVE
RPR, EXTERNAL: NON REACTIVE
RUBELLA, EXTERNAL: NORMAL

## 2022-02-28 LAB — GRBS, EXTERNAL: NEGATIVE

## 2022-03-19 PROBLEM — O09.32 LATE PRENATAL CARE IN SECOND TRIMESTER: Status: ACTIVE | Noted: 2020-04-01

## 2022-03-20 PROBLEM — D58.2 HEMOGLOBIN C TRAIT (HCC): Status: ACTIVE | Noted: 2020-04-06

## 2022-03-26 ENCOUNTER — HOSPITAL ENCOUNTER (INPATIENT)
Age: 22
LOS: 2 days | Discharge: HOME | DRG: 560 | End: 2022-03-28
Attending: OBSTETRICS & GYNECOLOGY | Admitting: OBSTETRICS & GYNECOLOGY
Payer: MEDICAID

## 2022-03-26 PROBLEM — Z34.90 PREGNANCY: Status: ACTIVE | Noted: 2022-03-26

## 2022-03-26 LAB
ABO + RH BLD: NORMAL
APPEARANCE UR: CLEAR
BASOPHILS # BLD: 0.1 K/UL (ref 0–0.1)
BASOPHILS NFR BLD: 0 % (ref 0–2)
BILIRUB UR QL: NEGATIVE
BLOOD GROUP ANTIBODIES SERPL: NORMAL
COLOR UR: YELLOW
DIFFERENTIAL METHOD BLD: ABNORMAL
EOSINOPHIL # BLD: 0 K/UL (ref 0–0.4)
EOSINOPHIL NFR BLD: 0 % (ref 0–5)
ERYTHROCYTE [DISTWIDTH] IN BLOOD BY AUTOMATED COUNT: 17.1 % (ref 11.6–14.5)
GLUCOSE UR QL STRIP.AUTO: NEGATIVE MG/DL
HCT VFR BLD AUTO: 32.4 % (ref 35–45)
HGB BLD-MCNC: 11 G/DL (ref 12–16)
IMM GRANULOCYTES # BLD AUTO: 0.2 K/UL (ref 0–0.04)
IMM GRANULOCYTES NFR BLD AUTO: 1 % (ref 0–0.5)
KETONES UR-MCNC: NEGATIVE MG/DL
LEUKOCYTE ESTERASE UR QL STRIP: NEGATIVE
LYMPHOCYTES # BLD: 1.9 K/UL (ref 0.9–3.6)
LYMPHOCYTES NFR BLD: 13 % (ref 21–52)
MCH RBC QN AUTO: 24.6 PG (ref 24–34)
MCHC RBC AUTO-ENTMCNC: 34 G/DL (ref 31–37)
MCV RBC AUTO: 72.5 FL (ref 78–100)
MONOCYTES # BLD: 0.8 K/UL (ref 0.05–1.2)
MONOCYTES NFR BLD: 6 % (ref 3–10)
NEUTS SEG # BLD: 12 K/UL (ref 1.8–8)
NEUTS SEG NFR BLD: 80 % (ref 40–73)
NITRITE UR QL: NEGATIVE
NRBC # BLD: 0.02 K/UL (ref 0–0.01)
NRBC BLD-RTO: 0.1 PER 100 WBC
PH UR: 7 [PH] (ref 5–9)
PLATELET # BLD AUTO: 307 K/UL (ref 135–420)
PMV BLD AUTO: 11.4 FL (ref 9.2–11.8)
PROT UR QL: 30 MG/DL
RBC # BLD AUTO: 4.47 M/UL (ref 4.2–5.3)
RBC # UR STRIP: ABNORMAL /UL
SERVICE CMNT-IMP: ABNORMAL
SP GR UR: 1.02 (ref 1–1.02)
SPECIMEN EXP DATE BLD: NORMAL
UROBILINOGEN UR QL: 0.2 EU/DL (ref 0.2–1)
WBC # BLD AUTO: 15 K/UL (ref 4.6–13.2)

## 2022-03-26 PROCEDURE — 0HQ9XZZ REPAIR PERINEUM SKIN, EXTERNAL APPROACH: ICD-10-PCS | Performed by: OBSTETRICS & GYNECOLOGY

## 2022-03-26 PROCEDURE — 75410000000 HC DELIVERY VAGINAL/SINGLE

## 2022-03-26 PROCEDURE — 86900 BLOOD TYPING SEROLOGIC ABO: CPT

## 2022-03-26 PROCEDURE — 4A1HXCZ MONITORING OF PRODUCTS OF CONCEPTION, CARDIAC RATE, EXTERNAL APPROACH: ICD-10-PCS | Performed by: OBSTETRICS & GYNECOLOGY

## 2022-03-26 PROCEDURE — 65270000029 HC RM PRIVATE

## 2022-03-26 PROCEDURE — 81003 URINALYSIS AUTO W/O SCOPE: CPT

## 2022-03-26 PROCEDURE — 74011250637 HC RX REV CODE- 250/637: Performed by: ADVANCED PRACTICE MIDWIFE

## 2022-03-26 PROCEDURE — 85025 COMPLETE CBC W/AUTO DIFF WBC: CPT

## 2022-03-26 PROCEDURE — 99282 EMERGENCY DEPT VISIT SF MDM: CPT

## 2022-03-26 PROCEDURE — 59025 FETAL NON-STRESS TEST: CPT

## 2022-03-26 PROCEDURE — 75410000002 HC LABOR FEE PER 1 HR

## 2022-03-26 PROCEDURE — 75410000003 HC RECOV DEL/VAG/CSECN EA 0.5 HR

## 2022-03-26 RX ORDER — ACETAMINOPHEN 325 MG/1
650 TABLET ORAL
Status: DISCONTINUED | OUTPATIENT
Start: 2022-03-26 | End: 2022-03-28 | Stop reason: HOSPADM

## 2022-03-26 RX ORDER — IBUPROFEN 400 MG/1
800 TABLET ORAL
Status: DISCONTINUED | OUTPATIENT
Start: 2022-03-26 | End: 2022-03-28 | Stop reason: HOSPADM

## 2022-03-26 RX ADMIN — IBUPROFEN 800 MG: 400 TABLET ORAL at 19:41

## 2022-03-26 RX ADMIN — ACETAMINOPHEN 650 MG: 325 TABLET ORAL at 23:50

## 2022-03-26 NOTE — PROGRESS NOTES
1110- Patient presents to labor and delivery  10weeks 1day with contractions that started @ 0700 this am, ultrasound and TOCO placed on abdomin, abdomin soft to palpate, triage assessment performed    1135- SVE- 4100/-1     1158- Patient moved to labor fm 3    1335- Patient on birthing ball, tolerating well     [phone]- Patient on birthing ball, tolerating well     1700-ANGELINA Lancaster at bedside    [id number]- Patient standing at bedside, swaying using bed as stability     1745- ANGELINA Lancaster at bedside, begin to push     1812-TOB live female infant    36- placenta out    1915- Bedside and Verbal shift change report given to LATASHA Brody RN  (oncoming nurse) by LATASHA Hunt RN  (offgoing nurse). Report included the following information SBAR, Kardex and MAR.

## 2022-03-26 NOTE — H&P
History & Physical    Name: Royal Josue MRN: [id number]  SSN: [id number]    YOB: 2000  Age: 24 y.o. Sex: female      Subjective:     Estimated Date of Delivery: 3/25/22  OB History    Para Term  AB Living   2 1   1   1   SAB IAB Ectopic Molar Multiple Live Births           0 1      # Outcome Date GA Lbr Magno/2nd Weight Sex Delivery Anes PTL Lv   2 Current            1  20 36w6d  2.07 kg M Vag-Spont None N MAHENDRA       Ms. Chyna Albright is a  admitted with pregnancy at 40w1d for labor. Prenatal course was complicated by previous history of  birth at 42 weeks and anxiety not currently on medications. .  Please see prenatal records for details. History reviewed. No pertinent past medical history. History reviewed. No pertinent surgical history. Social History     Occupational History    Not on file   Tobacco Use    Smoking status: Never Smoker    Smokeless tobacco: Former User   Substance and Sexual Activity    Alcohol use: Not Currently    Drug use: Not Currently    Sexual activity: Not Currently     Partners: Male     Birth control/protection: None     History reviewed. No pertinent family history. No Known Allergies  Prior to Admission medications    Medication Sig Start Date End Date Taking? Authorizing Provider   prenatal vit 85-iron-FA 1-dha (Prenate Pixie) 10 mg iron- 1 mg-200 mg cap Take 1 Tab by mouth daily. Indications: pregnancy 20  Yes Isha Clarity, NP   diphenhydrAMINE (BenadryL) 25 mg capsule Take 25 mg by mouth every six (6) hours as needed. Provider, Historical        Review of Systems: A comprehensive review of systems was negative except for that written in the History of Present Illness. Objective:     Vitals:  Vitals:    22 1127 22 1204   BP: 127/83    Pulse: 98    Resp: 16    Temp: 98 °F (36.7 °C)    Weight:  70.8 kg (156 lb)   Height:  5' 6\" (1.676 m)        Physical Exam:  Patient without distress.   Heart: Regular rate and rhythm, S1S2 present or without murmur or extra heart sounds  Lung: clear to auscultation throughout lung fields, no wheezes, no rales, no rhonchi and normal respiratory effort  Abdomen: soft, nontender  Fundus: soft and non tender  Perineum: blood absent, amniotic fluid absent  Cervical Exam: 4 Cm and BB/-2 per RN   Lower Extremities: No edema, no erythema, warmth, tenderness   Membranes:  Intact  Fetal Heart Rate: Reactive          Prenatal Labs:   Lab Results   Component Value Date/Time    ABO/Rh(D) A POSITIVE 2022 12:27 PM    Rubella, External immune 2021 12:00 AM    HBsAg, External negative 2021 12:00 AM    HIV, External non reactive 2021 12:00 AM    RPR, External non reactive 2021 12:00 AM    Gonorrhea, External negative  2021 12:00 AM    Chlamydia, External negative  2021 12:00 AM    ABO,Rh A+ 2020 12:00 AM    GrBStrep, External negative  2022 12:00 AM       Impression/Plan:     Active Problems:    Pregnancy (3/26/2022)         Plan: Admit for labor. Group B Strep negative. Ambulation per patient request. Expectant management and anticipate .

## 2022-03-26 NOTE — L&D DELIVERY NOTE
Patient: Susana Morgan                   Delivery Summary    Circumcision:   NA-female    Information for the patient's :  Remy Vazquez [[id number]]     Delivery Type: Vaginal, Spontaneous   Delivery Date: 3/26/2022   Delivery Time: 6:12 PM     Birth Weight: 2.84 kg     Sex:  female  Delivery Clinician:      Gestational Age: 44w3d    Presentation: Vertex   Position:    Occiput  Anterior     Apgars were 8  and 9      Resuscitation Method: Suctioning-bulb; Tactile Stimulation     Meconium Stained: Terminal    Living Status: Living       Placenta Date/Time: 3/26/2022  6:17 PM   Placenta Removal: Spontaneous   Placenta Appearance: Normal;Other (comment)    Cord Information: 3 Vessels    Cord Events: None       Disposition of Cord Blood: Lab    Blood Gases Sent?:  No       Cord pH:  none    Episiotomy: None  Laceration(s): 1st    Estimated Blood Loss (ml): No data found    Labor Events  Method: No data found     Augmentation: No data found  Cervical Ripening: No data found No data found No data found     Induction - no    Induction Indication - N/A    Induction Method - N/A    Complications - none    NICU Admit - no    HTN Disorders - none    Diabetes - N/A    Operative Vaginal Delivery - none    Additional Delivery Comments - Summoned to room for patient spontaneously pushing. Found to be complete. CNM present for entire length of pushing. Intermittent fetal monitoring performed per [facility] guidelines. CNM present for entire length of pushing. While in hands and knees position, infant head delivered OA, followed by delivery of shoulders and body. Vigorous female infant was dried, stimulated, and bulb suctioned. Heavy bleeding noted with patient so cord was double clamped, cut and infant taken to warmer. IV pitocin bolus initiated and patient assisted to semi fowlers. Cord blood collected. Placenta delivered spontaneously, gali, intact with 3 vessel cord. Trailing membranes removed with ring forceps.  Fundus firm, midline, and 1 below umbilicus with bleeding well controlled. 1st degree laceration repaired with 3-0 vicryl in the usual fashion. Bilateral labial lacerations noted which were hemostatic and not in need of repair. . Maternal and infant VSS. Will begin routine postpartum and  care. Delivery Summary    Patient: Srini Ibarra MRN: [id number]  SSN: [id number]    YOB: 2000  Age: 24 y.o. Sex: female       Information for the patient's :  Sylvie Barth [[id number]]       Labor Events:    Labor: No    Steroids:     Cervical Ripening Date/Time:       Cervical Ripening Type:     Antibiotics During Labor: No   Rupture Identifier: Sac 1    Rupture Date/Time:       Rupture Type:     Amniotic Fluid Volume:  Moderate    Amniotic Fluid Description: Meconium    Amniotic Fluid Odor:      Induction:         Induction Date/Time:        Indications for Induction:      Augmentation:     Augmentation Date/Time:      Indications for Augmentation:     Labor complications: None       Additional complications:        Delivery Events:  Indications For Episiotomy:     Episiotomy: None   Perineal Laceration(s): 1st   Repaired: Yes   Periurethral Laceration Location:      Repaired:     Labial Laceration Location: bilateral   Repaired: No   Sulcal Laceration Location:     Repaired:     Vaginal Laceration Location:     Repaired:     Cervical Laceration Location:     Repaired:     Repair Suture: Vicryl 3-0   Number of Repair Packets: 1   Estimated Blood Loss (ml):  ml   Quantitative Blood Loss (ml)                Delivery Date: 3/26/2022    Delivery Time: 6:12 PM  Delivery Type: Vaginal, Spontaneous  Sex:  Female    Gestational Age: 44w3d   Delivery Clinician:     Living Status: Living   Delivery Location: L&D            APGARS  One minute Five minutes Ten minutes   Skin color: 0   1        Heart rate: 2   2        Grimace: 2   2        Muscle tone: 2   2        Breathin   2 Totals: 8   9            Presentation: Vertex    Position:   Occiput Anterior  Resuscitation Method:  Suctioning-bulb; Tactile Stimulation     Meconium Stained: Terminal      Cord Information: 3 Vessels  Complications: None  Cord around:    Delayed cord clamping? Yes  Cord clamped date/time:3/26/2022  6:13 PM  Disposition of Cord Blood: Lab    Blood Gases Sent?: No    Placenta:  Date/Time: 3/26/2022  6:17 PM  Removal: Spontaneous      Appearance: Normal;Other (comment)      Measurements:  Birth Weight: 2.84 kg      Birth Length: 50.5 cm      Head Circumference: 34 cm      Chest Circumference: 31 cm     Abdominal Girth: 29.5 cm    Other Providers:    , Obstetrician;Primary Nurse;Primary  Nurse;Nicu Nurse;Neonatologist;Anesthesiologist;Crna;Nurse Practitioner;Midwife;Nursery Nurse           Group B Strep:   Lab Results   Component Value Date/Time    Martintretejal, External negative  2022 12:00 AM     Information for the patient's :  Balta Velasco [[id number]]   No results found for: ABORH, PCTABR, PCTDIG, BILI, ABORHEXT, ABORH     No results for input(s): PCO2CB, PO2CB, HCO3I, SO2I, IBD, PTEMPI, SPECTI, PHICB, ISITE, IDEV, IALLEN in the last 72 hours.

## 2022-03-27 PROBLEM — O48.0 POST-TERM PREGNANCY, 40-42 WEEKS OF GESTATION: Status: ACTIVE | Noted: 2022-03-27

## 2022-03-27 LAB
HCT VFR BLD AUTO: 28.5 % (ref 35–45)
HGB BLD-MCNC: 9.6 G/DL (ref 12–16)

## 2022-03-27 PROCEDURE — 74011250637 HC RX REV CODE- 250/637: Performed by: ADVANCED PRACTICE MIDWIFE

## 2022-03-27 PROCEDURE — 65270000029 HC RM PRIVATE

## 2022-03-27 PROCEDURE — 36415 COLL VENOUS BLD VENIPUNCTURE: CPT

## 2022-03-27 PROCEDURE — 74011250637 HC RX REV CODE- 250/637: Performed by: OBSTETRICS & GYNECOLOGY

## 2022-03-27 PROCEDURE — 85018 HEMOGLOBIN: CPT

## 2022-03-27 RX ORDER — AMOXICILLIN 250 MG
1 CAPSULE ORAL
Status: DISCONTINUED | OUTPATIENT
Start: 2022-03-27 | End: 2022-03-28 | Stop reason: HOSPADM

## 2022-03-27 RX ORDER — PROMETHAZINE HYDROCHLORIDE 25 MG/ML
25 INJECTION, SOLUTION INTRAMUSCULAR; INTRAVENOUS
Status: DISCONTINUED | OUTPATIENT
Start: 2022-03-27 | End: 2022-03-28 | Stop reason: HOSPADM

## 2022-03-27 RX ORDER — LANOLIN ALCOHOL/MO/W.PET/CERES
1 CREAM (GRAM) TOPICAL EVERY OTHER DAY
Status: DISCONTINUED | OUTPATIENT
Start: 2022-03-27 | End: 2022-03-28 | Stop reason: HOSPADM

## 2022-03-27 RX ORDER — OXYCODONE AND ACETAMINOPHEN 5; 325 MG/1; MG/1
2 TABLET ORAL
Status: DISCONTINUED | OUTPATIENT
Start: 2022-03-27 | End: 2022-03-28 | Stop reason: HOSPADM

## 2022-03-27 RX ORDER — LANOLIN ALCOHOL/MO/W.PET/CERES
3 CREAM (GRAM) TOPICAL
Status: DISCONTINUED | OUTPATIENT
Start: 2022-03-27 | End: 2022-03-28 | Stop reason: HOSPADM

## 2022-03-27 RX ADMIN — VITAMIN A, VITAMIN C, VITAMIN D, VITAMIN E, THIAMINE, RIBOFLAVIN, NIACIN, VITAMIN B6, FOLIC ACID, VITAMIN B12, CALCIUM, IRON, ZINC, COPPER 1 TABLET: 4000; 120; 400; 22; 1.84; 3; 20; 10; 1; 12; 200; 27; 25; 2 TABLET ORAL at 12:46

## 2022-03-27 RX ADMIN — FERROUS SULFATE TAB 325 MG (65 MG ELEMENTAL FE) 325 MG: 325 (65 FE) TAB at 12:46

## 2022-03-27 RX ADMIN — IBUPROFEN 800 MG: 400 TABLET ORAL at 10:20

## 2022-03-27 RX ADMIN — IBUPROFEN 800 MG: 400 TABLET ORAL at 18:00

## 2022-03-27 RX ADMIN — OXYCODONE AND ACETAMINOPHEN 2 TABLET: 5; 325 TABLET ORAL at 05:24

## 2022-03-27 RX ADMIN — ACETAMINOPHEN 650 MG: 325 TABLET ORAL at 15:14

## 2022-03-27 NOTE — PROGRESS NOTES
2305 Pt up to bathroom for second void. 2312 Pt. joined at bedside by  baby. AAOx4. Pain 2/10. Fundus firm at U-1, scant rubra lochia. No clots noted. Educated on signs and symptoms to report and plan of care. No further questions or concerns at this time. Callbell within reach. Bed in lowest position. TRANSFER - IN REPORT:    Verbal report received from Nuenz Proc. Andre Champ 1, RN(name) on [employer]  being received from labor and delivery(unit) for routine progression of care      Report consisted of patients Situation, Background, Assessment and   Recommendations(SBAR). Information from the following report(s) SBAR, Intake/Output, MAR and Recent Results was reviewed with the receiving nurse. Opportunity for questions and clarification was provided. Assessment completed upon patients arrival to unit and care assumed. 2350 Pt administered 2 tabs tylenol. 0100 Pt resting with call bell in reach. 0350 Pt vitals assessed. Fundus firm at U-1 with scant rubra lochia and pad change. 0524 Pt administered 2 tabs percocet. 4471 Pt holding infant. No needs at this time. 0715 Bedside shift change report given to Bishnu Perdue Rn (oncoming nurse) by Guillermo Helton RN (offgoing nurse). Report included the following information SBAR, Intake/Output, MAR and Recent Results.

## 2022-03-27 NOTE — PROGRESS NOTES
Post-Partum Day Number 1 Progress Note    Asmita Sanz     Assessment:   Hospital Problems  Date Reviewed: 3/26/2022          Codes Class Noted POA    Pregnancy ICD-10-CM: Z34.90  ICD-9-CM: V22.2  3/26/2022 Yes        Perineal laceration, first degree, delivered ICD-10-CM: O70.0  ICD-9-CM: 664.01  3/26/2022 No        Spontaneous vaginal delivery ICD-10-CM: O80  ICD-9-CM: 088  2020 Yes            Doing well, post partum day 1    Plan:  1. Continue routine postpartum and perineal care as well as maternal education. 2. Plan for discharge tomorrow. Information for the patient's :  Faby Soto [[id number]]   Vaginal, Spontaneous    Patient doing well without significant complaint. Voiding without difficulty, normal lochia. Patient feels hungry. Current Facility-Administered Medications   Medication Dose Route Frequency       Vitals:  Visit Vitals  /67 (BP 1 Location: Left arm, BP Patient Position: At rest)   Pulse 85   Temp 97.6 °F (36.4 °C)   Resp 17   Ht 5' 6\" (1.676 m)   Wt 70.8 kg (156 lb)   SpO2 100%   Breastfeeding Unknown   BMI 25.18 kg/m²     Temp (24hrs), Av.9 °F (36.6 °C), Min:97.6 °F (36.4 °C), Max:98.1 °F (36.7 °C)        Exam:   Patient without distress. Abdomen soft, fundus firm, nontender                Perineum with normal lochia noted. Lower extremities are negative for swelling, cords or tenderness.     Labs:     Lab Results   Component Value Date/Time    WBC 15.0 (H) 2022 12:27 PM    WBC 18.1 (H) 2020 09:45 AM    WBC 9.4 2020 01:19 PM    HGB 9.6 (L) 2022 02:03 AM    HGB 11.0 (L) 2022 12:27 PM    HGB 12.3 2020 04:20 AM    HCT 28.5 (L) 2022 02:03 AM    HCT 32.4 (L) 2022 12:27 PM    HCT 34.2 (L) 2020 04:20 AM    PLATELET 156  12:27 PM    PLATELET 492  09:45 AM    PLATELET 453  01:19 PM       Recent Results (from the past 24 hour(s))   POC URINE MACROSCOPIC Collection Time: 03/26/22 11:41 AM   Result Value Ref Range    Color YELLOW      Appearance CLEAR      Spec. gravity (POC) 1.020 1.001 - 1.023      pH, urine  (POC) 7.0 5.0 - 9.0      Protein (POC) 30 (A) NEG mg/dL    Glucose, urine (POC) Negative NEG mg/dL    Ketones (POC) Negative NEG mg/dL    Bilirubin (POC) Negative NEG      Blood (POC) SMALL (A) NEG      Urobilinogen (POC) 0.2 0.2 - 1.0 EU/dL    Nitrite (POC) Negative NEG      Leukocyte esterase (POC) Negative NEG      Performed by Cristiane Chiu    CBC WITH AUTOMATED DIFF    Collection Time: 03/26/22 12:27 PM   Result Value Ref Range    WBC 15.0 (H) 4.6 - 13.2 K/uL    RBC 4.47 4.20 - 5.30 M/uL    HGB 11.0 (L) 12.0 - 16.0 g/dL    HCT 32.4 (L) 35.0 - 45.0 %    MCV 72.5 (L) 78.0 - 100.0 FL    MCH 24.6 24.0 - 34.0 PG    MCHC 34.0 31.0 - 37.0 g/dL    RDW 17.1 (H) 11.6 - 14.5 %    PLATELET 539 356 - 382 K/uL    MPV 11.4 9.2 - 11.8 FL    NRBC 0.1 (H) 0  WBC    ABSOLUTE NRBC 0.02 (H) 0.00 - 0.01 K/uL    NEUTROPHILS 80 (H) 40 - 73 %    LYMPHOCYTES 13 (L) 21 - 52 %    MONOCYTES 6 3 - 10 %    EOSINOPHILS 0 0 - 5 %    BASOPHILS 0 0 - 2 %    IMMATURE GRANULOCYTES 1 (H) 0.0 - 0.5 %    ABS. NEUTROPHILS 12.0 (H) 1.8 - 8.0 K/UL    ABS. LYMPHOCYTES 1.9 0.9 - 3.6 K/UL    ABS. MONOCYTES 0.8 0.05 - 1.2 K/UL    ABS. EOSINOPHILS 0.0 0.0 - 0.4 K/UL    ABS. BASOPHILS 0.1 0.0 - 0.1 K/UL    ABS. IMM.  GRANS. 0.2 (H) 0.00 - 0.04 K/UL    DF AUTOMATED     TYPE & SCREEN    Collection Time: 03/26/22 12:27 PM   Result Value Ref Range    Crossmatch Expiration 03/29/2022,2357     ABO/Rh(D) A POSITIVE     Antibody screen NEG    HGB & HCT    Collection Time: 03/27/22  2:03 AM   Result Value Ref Range    HGB 9.6 (L) 12.0 - 16.0 g/dL    HCT 28.5 (L) 35.0 - 45.0 %

## 2022-03-27 NOTE — PROGRESS NOTES
Bedside and Verbal shift change report given to Tramaine Bernal RN (oncoming nurse) by Roel Nettles (offgoing nurse). Report included the following information SBAR, Kardex, Intake/Output, MAR, Recent Results and Med Rec.

## 2022-03-27 NOTE — PROGRESS NOTES
Problem: Vaginal Delivery: Postpartum Day 1  Goal: Activity/Safety  Outcome: Progressing Towards Goal  Goal: Diagnostic Test/Procedures  Outcome: Progressing Towards Goal  Goal: Discharge Planning  Outcome: Progressing Towards Goal  Goal: Medications  Outcome: Progressing Towards Goal  Goal: Psychosocial  Outcome: Progressing Towards Goal  Goal: *Vital signs within defined limits  Outcome: Progressing Towards Goal  Goal: *Labs within defined limits  Outcome: Progressing Towards Goal  Goal: *Hemodynamically stable  Outcome: Progressing Towards Goal  Goal: *Optimal pain control at patient's stated goal  Outcome: Progressing Towards Goal  Goal: *Participates in infant care  Outcome: Progressing Towards Goal  Goal: *Demonstrates progressive activity  Outcome: Progressing Towards Goal  Goal: *Performs self perineal care  Outcome: Progressing Towards Goal  Goal: *Appropriate parent-infant bonding  Outcome: Progressing Towards Goal     Problem: Pain  Goal: *Control of Pain  Outcome: Progressing Towards Goal

## 2022-03-27 NOTE — PROGRESS NOTES
0720 Bedside and Verbal shift change report given to Tawana Fall (oncoming nurse) by Loretta Boucher (offgoing nurse). Report included the following information SBAR, Kardex, Procedure Summary, MAR and Recent Results. 3372 pt sitting up in bed; eating bkfst; no c/o verbalized    0900 pt up ad ilya to BR; mabel act well. Assessment done    1020 CTylerCNM in to see pt; Motrin given for pain    1125 pt noted sleeping in bed; resp unlabored    [facility]; pt noted sleeping in bed; resp unlabored    1245 pt arouses easily when entering room; denies c/o at this time. In process of ordering lunch. Meds reviewed and given    1420 sitting up in bed; no c/o verbalized    1510 VS done; reassessment done. Tylenol given for c/o discomfort    1630 pt denies c/o or needs when asked. 1655 pt amb in hallway to nursery w/ infant bassinet for bath demo. Enc pt to increase intake of water/fluids    1720 pt c/o abd cramping pain; discussed pain control and meds prev given. Discussed w/ pt timing of next meds available. Heat pack provided x2 until Motrin can be adm next    1910 report given to [facility] for continued care of pt.  Pt noted sleeping at this time

## 2022-03-28 VITALS
TEMPERATURE: 97.8 F | WEIGHT: 156 LBS | OXYGEN SATURATION: 100 % | DIASTOLIC BLOOD PRESSURE: 87 MMHG | HEART RATE: 92 BPM | RESPIRATION RATE: 17 BRPM | BODY MASS INDEX: 25.07 KG/M2 | SYSTOLIC BLOOD PRESSURE: 121 MMHG | HEIGHT: 66 IN

## 2022-03-28 PROCEDURE — 74011250637 HC RX REV CODE- 250/637: Performed by: ADVANCED PRACTICE MIDWIFE

## 2022-03-28 PROCEDURE — 74011250637 HC RX REV CODE- 250/637: Performed by: OBSTETRICS & GYNECOLOGY

## 2022-03-28 RX ORDER — ACETAMINOPHEN AND CODEINE PHOSPHATE 120; 12 MG/5ML; MG/5ML
1 SOLUTION ORAL DAILY
Qty: 3 DOSE PACK | Refills: 3 | Status: SHIPPED | OUTPATIENT
Start: 2022-03-28

## 2022-03-28 RX ADMIN — IBUPROFEN 800 MG: 400 TABLET ORAL at 09:58

## 2022-03-28 RX ADMIN — VITAMIN A, VITAMIN C, VITAMIN D, VITAMIN E, THIAMINE, RIBOFLAVIN, NIACIN, VITAMIN B6, FOLIC ACID, VITAMIN B12, CALCIUM, IRON, ZINC, COPPER 1 TABLET: 4000; 120; 400; 22; 1.84; 3; 20; 10; 1; 12; 200; 27; 25; 2 TABLET ORAL at 09:58

## 2022-03-28 RX ADMIN — OXYCODONE AND ACETAMINOPHEN 2 TABLET: 5; 325 TABLET ORAL at 01:05

## 2022-03-28 NOTE — PROGRESS NOTES
2310 Bedside shift report given to JUVENTINO Le, RN (Oncoming Nurse) from Luigi Oliveira RN (outgoing nurse). Report consisted of patients Situation, Background, Assessment and Recommendations(SBAR). Information from the following report(s) SBAR, Kardex, Intake/Output, MAR and Recent Results. 2323 Pt in bed. Pt breastfeeding Infant at this time. 0101 Pt joined at bedside by baby. AAOx4. VSS. Pain 7/10. Educated Pt on pain management, signs and symptoms to report. PRN percocet administered. Fundus firm at U-2 and midline. scant, rubra lochia. No clots noted. Bed in lowest position. Call bell within reach. 0331 Pt sleeping. Room light dimmed. 1653 Pt breastfeeding Infant. Needs and concerns addressed. 0715 Bedside shift report given to ANGELINA Murphy LPN (Oncoming Nurse) from Jorge Contreras RN (outgoing nurse). Report consisted of patients Situation, Background, Assessment and Recommendations(SBAR). Information from the following report(s) SBAR, Kardex, Intake/Output, MAR and recent Results.

## 2022-03-28 NOTE — DISCHARGE INSTRUCTIONS
Patient Education        POST DELIVERY DISCHARGE INSTRUCTIONS    Name: Radha Silverman  YOB: 2000  Primary Diagnosis: Active Problems:    Spontaneous vaginal delivery (2020)      Pregnancy (3/26/2022)      Perineal laceration, first degree, delivered (3/26/2022)      Post-term pregnancy, 40-42 weeks of gestation (3/27/2022)        General:     Diet/Diet Restrictions:  Eight 8-ounce glasses of fluid daily (water, juices); avoid excessive caffeine intake. Meals/snacks as desired which are high in fiber and carbohydrates and low in fat and cholesterol. Physical Activity / Restrictions / Safety:     Avoid heavy lifting, no more that 8 lbs. For 2-3 weeks; limit use of stairs to 2 times daily for the first week home. No driving for one week. Avoid intercourse 4-6 weeks, no douching or tampon use. Check with obstetrician before starting or resuming an exercise program.         Discharge Instructions/Special Treatment/Home Care Needs:     Continue prenatal vitamins. Continue to use squirt bottle with warm water on your episiotomy after each bathroom use until bleeding stops. If steri-strips applied to your incision, remove in 7-10 days. Call your doctor for the following:     Fever over 101 degrees by mouth. Vaginal bleeding heavier than a normal menstrual period or clot larger than a golf ball. Red streaks or increased swelling of legs, painful red streaks on your breast.  Painful urination, constipation and increased pain or swelling or discharge with your incision. If you feel extremely anxious or overwhelmed. If you have thoughts of harming yourself and/or your baby. Pain Management:     Pain Management:   Take Acetaminophen (Tylenol) or Ibuprofen (Advil, Motrin), as directed for pain. Use a warm Sitz bath 3 times daily to relieve episiotomy or hemorrhoidal discomfort. Heating pad to  incision as needed.  For hemorrhoidal discomfort, use Tucks and Anusol cream as needed and directed. Follow-Up Care: These are general instructions for a healthy lifestyle:    No smoking/ No tobacco products/ Avoid exposure to second hand smoke    Surgeon General's Warning:  Quitting smoking now greatly reduces serious risk to your health. Obesity, smoking, and sedentary lifestyle greatly increases your risk for illness    A healthy diet, regular physical exercise & weight monitoring are important for maintaining a healthy lifestyle    Recognize signs and symptoms of STROKE:    F-face looks uneven    A-arms unable to move or move unevenly    S-speech slurred or non-existent    T-time-call 911 as soon as signs and symptoms begin-DO NOT go       Back to bed or wait to see if you get better-TIME IS BRAIN. Patient Education        After Your Delivery (the Postpartum Period): Care Instructions  Overview     Congratulations on the birth of your baby. Like pregnancy, the  period can be a time of excitement, justin, and exhaustion. You may look at your wondrous little baby and feel happy. You may also be overwhelmed by your new sleep hours and new responsibilities. At first, babies often sleep during the days and are awake at night. They do not have a pattern or routine. They may make sudden gasps, jerk themselves awake, or look like they have crossed eyes. These are all normal, and they may even make you smile. In these first weeks after delivery, try to take good care of yourself. It may take 4 to 6 weeks to feel like yourself again, and possibly longer if you had a  birth. You will likely feel very tired for several weeks. Your days will be full of ups and downs, but lots of justin as well. Follow-up care is a key part of your treatment and safety. Be sure to make and go to all appointments, and call your doctor if you are having problems. It's also a good idea to know your test results and keep a list of the medicines you take. How can you care for yourself at home?   Take care of your body after delivery  · Use pads instead of tampons for the bloody flow that may last as long as 2 weeks. · Ease cramps with ibuprofen (Advil, Motrin). · Ease soreness of hemorrhoids and the area between your vagina and rectum with ice compresses or witch hazel pads. · Ease constipation by drinking lots of fluid and eating high-fiber foods. Ask your doctor about over-the-counter stool softeners. · Cleanse yourself with a gentle squeeze of warm water from a bottle instead of wiping with toilet paper. · Take a sitz bath in warm water several times a day. · Wear a good nursing bra. Ease sore and swollen breasts with warm, wet washcloths. · If you aren't breastfeeding, use ice rather than heat for breast soreness. · Your period may not start for several months if you are breastfeeding. You may bleed more, and longer at first, than you did before you got pregnant. · Wait until you are healed (about 4 to 6 weeks) before you have sex. Ask your doctor when it is okay for you to have sex. · Try not to travel with your baby for 5 or 6 weeks. If you take a long car trip, make frequent stops to walk around and stretch. Avoid exhaustion  · Rest every day. Try to nap when your baby naps. · Ask another adult to be with you for a few days after delivery. · Plan for  if you have other children. · Stay flexible so you can eat at odd hours and sleep when you need to. Both you and your baby are making new schedules. · Plan small trips to get out of the house. Change can make you feel less tired. · Ask for help with housework, cooking, and shopping. Remind yourself that your job is to care for your baby. Know about help for postpartum depression  · \"Baby blues\" are common for the first 1 to 2 weeks after birth. You may cry or feel sad or irritable for no reason. · Rest whenever you can. Being tired makes it harder to handle your emotions. · Go for walks with your baby.   · Talk to your partner, friends, and family about your feelings. · If your symptoms last for more than a few weeks, or if you feel very depressed, ask your doctor for help. · Postpartum depression can be treated. Support groups and counseling can help. Sometimes medicine can also help. Stay healthy  · Eat healthy foods so you have more energy. · If you breastfeed, avoid drugs. If you quit smoking during pregnancy, try to stay smoke-free. If you choose to have a drink now and then, have only one drink, and limit the number of occasions that you have a drink. Wait to breastfeed at least 2 hours after you have a drink to reduce the amount of alcohol the baby may get in the milk. · Start daily exercise after 4 to 6 weeks, but rest when you feel tired. · Learn exercises to tone your belly. Do Kegel exercises to regain strength in your pelvic muscles. You can do these exercises while you stand or sit. ? Squeeze the same muscles you would use to stop your urine. Your belly and thighs should not move. ? Hold the squeeze for 3 seconds, and then relax for 3 seconds. ? Start with 3 seconds. Then add 1 second each week until you are able to squeeze for 10 seconds. ? Repeat the exercise 10 to 15 times for each session. Do three or more sessions each day. · Find a class for you and your baby that has an exercise time. · If you had a  birth, give yourself a bit more time before you exercise, and be careful. When should you call for help? Share this information with your partner, family, or a friend. They can help you watch for warning signs. Call 911  anytime you think you may need emergency care. For example, call if:    · You have thoughts of harming yourself, your baby, or another person.     · You passed out (lost consciousness).     · You have chest pain, are short of breath, or cough up blood.     · You have a seizure.    Call your doctor now or seek immediate medical care if:    · You have signs of hemorrhage (too much bleeding), such as:  ? Heavy vaginal bleeding. This means that you are soaking through one or more pads in an hour. Or you pass blood clots bigger than an egg. ? Feeling dizzy or lightheaded, or you feel like you may faint. ? Feeling so tired or weak that you cannot do your usual activities. ? A fast or irregular heartbeat. ? New or worse belly pain.     · You have signs of infection, such as:  ? A fever. ? Vaginal discharge that smells bad.  ? New or worse belly pain.     · You have symptoms of a blood clot in your leg (called a deep vein thrombosis), such as:  ? Pain in the calf, back of the knee, thigh, or groin. ? Redness and swelling in your leg or groin.     · You have signs of preeclampsia, such as:  ? Sudden swelling of your face, hands, or feet. ? New vision problems (such as dimness, blurring, or seeing spots). ? A severe headache. Watch closely for changes in your health, and be sure to contact your doctor if:    · Your vaginal bleeding isn't decreasing.     · You feel sad, anxious, or hopeless for more than a few days.     · You are having problems with your breasts or breastfeeding. Where can you learn more? Go to http://www.[employer].com/  Enter A461 in the search box to learn more about \"After Your Delivery (the Postpartum Period): Care Instructions. \"  Current as of: June 16, 2021               Content Version: 13.2  © 5375-0191 [employer]. Care instructions adapted under license by [employer] (which disclaims liability or warranty for this information). If you have questions about a medical condition or this instruction, always ask your healthcare professional. [place] [address] any warranty or liability for your use of this information. Learning About Birth Control After Childbirth  What is birth control? Birth control (contraception) is any method used to prevent pregnancy.   Wait until you're healed before you have sexual intercourse. This takes about 4 to 6 weeks. If you have sex without birth control, there is a chance that you could get pregnant. This is true even if you haven't started having periods again. Even if you breastfeed, you can still get pregnant. Most experts suggest waiting at least 18 months to get pregnant again. This can reduce risks for you and the baby. There may be reasons for you to get pregnant sooner, though. So talk with your doctor about the risks and benefits. The only sure way to not get pregnant is to not have sex. But finding a good method of birth control that you're comfortable with can help you avoid an unplanned pregnancy. Your doctor can help you choose the birth control method that's right for you. What are the types of birth control? · Long-acting reversible contraception (LARC). This is the most effective reversible method you can use to prevent pregnancy. LARCs are implants and intrauterine devices (IUDs). While they are being used, they usually prevent pregnancy for years. If you decide you want to get pregnant, you can have them removed. ? Implants are placed under the skin of the arm. This can be done right after you give birth. They release the hormone progestin. They prevent pregnancy for about 3 years. ? IUDs are placed in the uterus by a doctor. This can be done right after you give birth, if you and your doctor discuss it beforehand. Or it can be done at a doctor visit later. There are two main types of IUDs--the copper IUD and the hormonal IUD. The hormonal IUD releases progestin. IUDs prevent pregnancy for 3 to 10 years, depending on the type. · Hormonal methods. They are very good at preventing pregnancy. Combination birth control pills (\"the pill\"), skin patches, and vaginal rings release the hormones estrogen and progestin. Depo-Provera is a shot you get every 3 months. Shots, mini-pills, IUDs, and implants release progestin only.  It's best to use progestin-only options in the first few weeks after you give birth. · Barrier methods. These don't prevent pregnancy as well as implants, IUDs, or hormonal methods do. Barrier methods include condoms, diaphragms, and cervical caps. You must use them every time you have sex. If you had a diaphragm or cervical cap before you got pregnant, talk to your doctor to see if you need a different size. Condoms can be used anytime after you give birth. · Natural family planning. This is also known as fertility awareness or the rhythm method. It can work if you and your partner are careful and you have a regular ovulation cycle. But it doesn't work better than other birth control methods. You will need to keep records so you know when you are most likely to become pregnant. And during those times, you will need to use a barrier method or not have sex. · Permanent birth control (sterilization). It gives you lasting protection against pregnancy. A man can have a vasectomy. A woman can have her tubes tied (tubal ligation). But this is only a good choice if you are sure that you don't want any more children. · Emergency contraception. This is a backup method to prevent pregnancy if you didn't use birth control or if a condom breaks. The most effective emergency contraception is an IUD (inserted by a doctor). You can also get emergency contraceptive pills. You can get them with a prescription from your doctor or without a prescription at most drugstores. How can you get birth control? · You can buy:  ? Condoms and spermicides without a prescription. You can get them in drugstores, online, and in many grocery stores. ? Some forms of emergency contraception without a prescription. You can get these at most drugstores. · You need to see a doctor or visit family planning clinic to:  ? Get a prescription for birth control pills and other methods that use hormones. ? Have an implant or IUD inserted.  This includes the type of IUD used for emergency contraception. ? Get a hormone shot. ? Get a prescription for a diaphragm or cervical cap. ? Get a prescription for certain kinds of emergency contraception. Follow-up care is a key part of your treatment and safety. Be sure to make and go to all appointments, and call your doctor if you are having problems. It's also a good idea to know your test results and keep a list of the medicines you take. Where can you learn more? Go to http://www.gray.com/  Enter [id number] in the search box to learn more about \"Learning About Birth Control After Childbirth. \"  Current as of: June 16, 2021               Content Version: 13.2  © 7222-7422 [employer], [employer]. Care instructions adapted under license by [employer] (which disclaims liability or warranty for this information). If you have questions about a medical condition or this instruction, always ask your healthcare professional. [address] any warranty or liability for your use of this information.

## 2022-03-28 NOTE — PROGRESS NOTES
2000 Received care of mother in room, no distress, call bell in reach,  ass essment compleled  2300 BEDSIDE_VERBAL_RECORDED_WRITTEN: shift change report given to Rod Piña (oncoming nurse) by Jennifer Veras (offgoing nurse). Report given with NNAMDI, Dominik and MAR.

## 2022-03-28 NOTE — PROGRESS NOTES
Problem: Patient Education: Go to Patient Education Activity  Goal: Patient/Family Education  Outcome: Progressing Towards Goal     Problem: Vaginal Delivery: Postpartum Day 1  Goal: Activity/Safety  Outcome: Progressing Towards Goal  Goal: Consults, if ordered  Outcome: Progressing Towards Goal  Goal: Diagnostic Test/Procedures  Outcome: Progressing Towards Goal  Goal: Discharge Planning  Outcome: Progressing Towards Goal  Goal: Medications  Outcome: Progressing Towards Goal  Goal: Treatments/Interventions/Procedures  Outcome: Progressing Towards Goal  Goal: Psychosocial  Outcome: Progressing Towards Goal  Goal: *Vital signs within defined limits  Outcome: Progressing Towards Goal  Goal: *Labs within defined limits  Outcome: Progressing Towards Goal  Goal: *Hemodynamically stable  Outcome: Progressing Towards Goal  Goal: *Optimal pain control at patient's stated goal  Outcome: Progressing Towards Goal  Goal: *Participates in infant care  Outcome: Progressing Towards Goal  Goal: *Demonstrates progressive activity  Outcome: Progressing Towards Goal  Goal: *Performs self perineal care  Outcome: Progressing Towards Goal  Goal: *Appropriate parent-infant bonding  Outcome: Progressing Towards Goal  Goal: *Performs self breast care  Outcome: Progressing Towards Goal     Problem: Pain  Goal: *Control of Pain  Outcome: Progressing Towards Goal

## 2022-03-28 NOTE — DISCHARGE SUMMARY
Obstetrical Discharge Summary     Name: Swapnil Vargas MRN: [id number]  SSN: [id number]    YOB: 2000  Age: 24 y.o. Sex: female      Admit Date: 3/26/2022    Discharge Date: 3/28/2022    Admitting Physician: Bi Jensen MD     Attending Physician:  Zari Irizarry MD     Discharge Diagnoses:   Information for the patient's :  Karena Banerjee [[id number]]   Delivery of a 2.84 kg female infant via Vaginal, Spontaneous on 3/26/2022 at 7:15 PM  by Beatrice Hollingsworth. Apgars were 8  and 9 . Additional Diagnoses:   Problem List as of 3/28/2022 Date Reviewed: 3/27/2022          Codes Class Noted - Resolved    Post-term pregnancy, 40-42 weeks of gestation ICD-10-CM: O48.0  ICD-9-CM: 645.10  3/27/2022 - Present        Pregnancy ICD-10-CM: Z34.90  ICD-9-CM: V22.2  3/26/2022 - Present        Perineal laceration, first degree, delivered ICD-10-CM: O70.0  ICD-9-CM: 664.01  3/26/2022 - Present        Spontaneous vaginal delivery ICD-10-CM: O80  ICD-9-CM: 884  2020 - Present        [facility] C trait ([facility] ICD-10-CM: D58.2  ICD-9-CM: 282.7  2020 - Present    Overview Addendum 2020  1:27 PM by Kalyan Noe     Heterozygous   FOB can't be tested - not in picture               Late prenatal care in second trimester ICD-10-CM: O09.32  ICD-9-CM: V23.7  2020 - Present    Overview Addendum 2020  1:32 PM by Zari Irizarry MD     20 Male fetus, EFW 86%             RESOLVED: Uterine contractions during pregnancy ICD-10-CM: O47.9  ICD-9-CM: 644.10  2020 - 2020              Lab Results   Component Value Date/Time    Rubella, External immune 2021 12:00 AM    GrBStrep, External negative  2022 12:00 AM     Recent Labs     22  0203   [facility] course following the delivery.  EPDS 11    Disposition: home    Discharge Condition: Good    Patient Instructions:   Current Discharge Medication List      START taking these medications    Details   norethindrone (MICRONOR) 0.35 mg tab Take 1 Tablet by mouth daily. Qty: 3 Dose Pack, Refills: 3         CONTINUE these medications which have NOT CHANGED    Details   prenatal vit 85-iron-FA 1-dha (Prenate Pixie) 10 mg iron- 1 mg-200 mg cap Take 1 Tab by mouth daily. Indications: pregnancy  Qty: 90 Cap, Refills: 2    Associated Diagnoses: Supervision of high-risk pregnancy, second trimester      diphenhydrAMINE (BenadryL) 25 mg capsule Take 25 mg by mouth every six (6) hours as needed. Reference my discharge instructions. Follow-up Appointments   Procedures    FOLLOW UP VISIT Appointment in: 6 Weeks Follow up in 4-6 weeks for your routine postpartum visit. Follow up in 4-6 weeks for your routine postpartum visit.      Standing Status:   Standing     Number of Occurrences:   1     Order Specific Question:   Appointment in     Answer:   6 Weeks        Signed By:  Lindy Larose MD     March 28, 2022

## 2022-03-28 NOTE — PROGRESS NOTES
0820: Bedside and Verbal shift change report given to [address] [place] [address] (oncoming nurse) by Rome Beatty LPN (offgoing nurse). Report included the following information SBAR, Kardex, Intake/Output, MAR and Recent Results. 2754: PRN motrin and Pre-subhash vitamin administered. 1005: Shift assessment completed. Pt has no further needs at this time. 1100: Pt resting w/ eyes closed. 1140: Pt resting w/ eyes closed. 1330: Rounded on pt. Pt breast feeding at this time and has no needs. [phone]: D/C teaching complete and copy of D/C teaching instruction given to pt with verbal understanding. Pt given opportunity for questions and denies comments/concerns/questions at this time. Bands removed and shredded. EPDS score 11, Dr. Didier Hernandez made aware.

## 2022-03-28 NOTE — PROGRESS NOTES
Post-Partum Day Number 2 Progress Note    Asmita Yvon     Assessment:   Hospital Problems  Date Reviewed: 3/27/2022          Codes Class Noted POA    Post-term pregnancy, 40-42 weeks of gestation ICD-10-CM: O48.0  ICD-9-CM: 645.10  3/27/2022 Unknown        Pregnancy ICD-10-CM: Z34.90  ICD-9-CM: V22.2  3/26/2022 Yes        Perineal laceration, first degree, delivered ICD-10-CM: O70.0  ICD-9-CM: 664.01  3/26/2022 No        Spontaneous vaginal delivery ICD-10-CM: O80  ICD-9-CM: 910  2020 Yes            Doing well, post partum day 2    Plan:   1. Discharge home today  2. Follow up in office in 6 weeks with Nazia Gagnon MD   3. Post partum activity advised, diet as tolerated  4. Discharge Medications: ibuprofen, tylenol, and medications prior to admission. Micronor for contraception. R/b/a discussed    Information for the patient's :  Mary Hunt [[id number]]   Vaginal, Spontaneous    Patient doing well without significant complaint. Voiding without difficulty, normal lochia. Current Facility-Administered Medications   Medication Dose Route Frequency    ferrous sulfate tablet 325 mg  1 Tablet Oral EVERY OTHER DAY    prenatal vit-calcium-iron-fa (PRENATAL PLUS with CALCIUM) tablet 1 Tablet  1 Tablet Oral DAILY       Vitals:  Visit Vitals  /81 (BP 1 Location: Right upper arm, BP Patient Position: Semi fowlers)   Pulse 77   Temp 98.5 °F (36.9 °C)   Resp 18   Ht 5' 6\" (1.676 m)   Wt 70.8 kg (156 lb)   SpO2 100%   Breastfeeding Unknown   BMI 25.18 kg/m²     Temp (24hrs), Av.3 °F (36.8 °C), Min:97.6 °F (36.4 °C), Max:98.7 °F (37.1 °C)      Exam:         Patient without distress. Abdomen soft, fundus firm, nontender                 Lower extremities are negative for swelling, cords or tenderness.     Labs:     Lab Results   Component Value Date/Time    WBC 15.0 (H) 2022 12:27 PM    WBC 18.1 (H) 2020 09:45 AM    WBC 9.4 2020 01:19 PM    HGB 9.6 (L) 03/27/2022 02:03 AM    HGB 11.0 (L) 03/26/2022 12:27 PM    HGB 12.3 06/29/2020 04:20 AM    HCT 28.5 (L) 03/27/2022 02:03 AM    HCT 32.4 (L) 03/26/2022 12:27 PM    HCT 34.2 (L) 06/29/2020 04:20 AM    PLATELET 317 62/08/1141 12:27 PM    PLATELET 689 15/01/7904 09:45 AM    PLATELET 607 20/68/7966 01:19 PM       No results found for this or any previous visit (from the past 24 hour(s)).

## 2023-06-27 ENCOUNTER — HOSPITAL ENCOUNTER (OUTPATIENT)
Facility: HOSPITAL | Age: 23
Discharge: HOME | End: 2023-06-28
Attending: OBSTETRICS & GYNECOLOGY | Admitting: OBSTETRICS & GYNECOLOGY

## 2023-06-27 VITALS — WEIGHT: 138 LBS | HEIGHT: 65 IN | BODY MASS INDEX: 22.99 KG/M2 | TEMPERATURE: 99.2 F

## 2023-06-27 PROBLEM — Z33.1 IUP (INTRAUTERINE PREGNANCY), INCIDENTAL: Status: ACTIVE | Noted: 2023-06-27

## 2023-06-27 LAB
APPEARANCE UR: ABNORMAL
BASOPHILS # BLD: 0 K/UL (ref 0–0.1)
BASOPHILS NFR BLD: 0 % (ref 0–2)
BILIRUB UR QL: NEGATIVE
COLOR UR: YELLOW
DIFFERENTIAL METHOD BLD: ABNORMAL
EOSINOPHIL # BLD: 0 K/UL (ref 0–0.4)
EOSINOPHIL NFR BLD: 0 % (ref 0–5)
ERYTHROCYTE [DISTWIDTH] IN BLOOD BY AUTOMATED COUNT: 17.7 % (ref 11.6–14.5)
GLUCOSE UR QL STRIP.AUTO: NEGATIVE MG/DL
HCT VFR BLD AUTO: 29.9 % (ref 35–45)
HGB BLD-MCNC: 10 G/DL (ref 12–16)
IMM GRANULOCYTES # BLD AUTO: 0.1 K/UL (ref 0–0.04)
IMM GRANULOCYTES NFR BLD AUTO: 1 % (ref 0–0.5)
KETONES UR-MCNC: NEGATIVE MG/DL
LEUKOCYTE ESTERASE UR QL STRIP: ABNORMAL
LYMPHOCYTES # BLD: 1.9 K/UL (ref 0.9–3.6)
LYMPHOCYTES NFR BLD: 18 % (ref 21–52)
MCH RBC QN AUTO: 23.6 PG (ref 24–34)
MCHC RBC AUTO-ENTMCNC: 33.4 G/DL (ref 31–37)
MCV RBC AUTO: 70.7 FL (ref 78–100)
MONOCYTES # BLD: 0.7 K/UL (ref 0.05–1.2)
MONOCYTES NFR BLD: 7 % (ref 3–10)
NEUTS SEG # BLD: 8.1 K/UL (ref 1.8–8)
NEUTS SEG NFR BLD: 74 % (ref 40–73)
NITRITE UR QL: NEGATIVE
NRBC # BLD: 0 K/UL (ref 0–0.01)
NRBC BLD-RTO: 0 PER 100 WBC
PH UR: 6.5 (ref 5–9)
PLATELET # BLD AUTO: 281 K/UL (ref 135–420)
PMV BLD AUTO: 10.7 FL (ref 9.2–11.8)
PROT UR QL: NEGATIVE MG/DL
RBC # BLD AUTO: 4.23 M/UL (ref 4.2–5.3)
RBC # UR STRIP: NEGATIVE
SERVICE CMNT-IMP: ABNORMAL
SP GR UR: 1.01 (ref 1–1.02)
UROBILINOGEN UR QL: 0.2 EU/DL (ref 0.2–1)
WBC # BLD AUTO: 11 K/UL (ref 4.6–13.2)

## 2023-06-27 PROCEDURE — 81003 URINALYSIS AUTO W/O SCOPE: CPT

## 2023-06-27 PROCEDURE — 85025 COMPLETE CBC W/AUTO DIFF WBC: CPT

## 2023-06-27 RX ORDER — SODIUM CHLORIDE, SODIUM LACTATE, POTASSIUM CHLORIDE, AND CALCIUM CHLORIDE .6; .31; .03; .02 G/100ML; G/100ML; G/100ML; G/100ML
1000 INJECTION, SOLUTION INTRAVENOUS ONCE
Status: COMPLETED | OUTPATIENT
Start: 2023-06-27 | End: 2023-06-28

## 2023-06-27 RX ORDER — ASCORBIC ACID, CHOLECALCIFEROL, .ALPHA.-TOCOPHEROL ACETATE, DL-, PYRIDOXINE, FOLIC ACID, CYANOCOBALAMIN, CALCIUM, FERROUS FUMARATE, MAGNESIUM, DOCONEXENT 85; 200; 10; 25; 1; 12; 140; 27; 45; 300 [IU]/1; [IU]/1; [IU]/1; [IU]/1; MG/1; UG/1; MG/1; MG/1; MG/1; MG/1
CAPSULE, GELATIN COATED ORAL
COMMUNITY

## 2023-06-27 RX ORDER — PROMETHAZINE HYDROCHLORIDE 25 MG/ML
12.5 INJECTION, SOLUTION INTRAMUSCULAR; INTRAVENOUS EVERY 6 HOURS PRN
Status: DISCONTINUED | OUTPATIENT
Start: 2023-06-27 | End: 2023-06-28 | Stop reason: HOSPADM

## 2023-06-27 RX ORDER — NALBUPHINE HYDROCHLORIDE 10 MG/ML
10 INJECTION, SOLUTION INTRAMUSCULAR; INTRAVENOUS; SUBCUTANEOUS
Status: DISCONTINUED | OUTPATIENT
Start: 2023-06-27 | End: 2023-06-28 | Stop reason: HOSPADM

## 2023-06-28 PROCEDURE — 59025 FETAL NON-STRESS TEST: CPT

## 2023-06-28 PROCEDURE — 99285 EMERGENCY DEPT VISIT HI MDM: CPT

## 2023-06-28 PROCEDURE — 87081 CULTURE SCREEN ONLY: CPT

## 2023-06-28 PROCEDURE — 96360 HYDRATION IV INFUSION INIT: CPT

## 2023-06-28 PROCEDURE — 96361 HYDRATE IV INFUSION ADD-ON: CPT

## 2023-06-28 PROCEDURE — 2580000003 HC RX 258: Performed by: ADVANCED PRACTICE MIDWIFE

## 2023-06-28 RX ORDER — SODIUM CHLORIDE, SODIUM LACTATE, POTASSIUM CHLORIDE, AND CALCIUM CHLORIDE .6; .31; .03; .02 G/100ML; G/100ML; G/100ML; G/100ML
1000 INJECTION, SOLUTION INTRAVENOUS ONCE
Status: COMPLETED | OUTPATIENT
Start: 2023-06-28 | End: 2023-06-28

## 2023-06-28 RX ADMIN — SODIUM CHLORIDE, POTASSIUM CHLORIDE, SODIUM LACTATE AND CALCIUM CHLORIDE 1000 ML: 600; 310; 30; 20 INJECTION, SOLUTION INTRAVENOUS at 01:57

## 2023-06-28 RX ADMIN — SODIUM CHLORIDE, POTASSIUM CHLORIDE, SODIUM LACTATE AND CALCIUM CHLORIDE 1000 ML: 600; 310; 30; 20 INJECTION, SOLUTION INTRAVENOUS at 00:00

## 2023-07-01 LAB
BACTERIA SPEC CULT: NORMAL
SERVICE CMNT-IMP: NORMAL

## 2023-07-09 ENCOUNTER — HOSPITAL ENCOUNTER (INPATIENT)
Facility: HOSPITAL | Age: 23
LOS: 3 days | Discharge: HOME | DRG: 560 | End: 2023-07-12
Attending: STUDENT IN AN ORGANIZED HEALTH CARE EDUCATION/TRAINING PROGRAM | Admitting: STUDENT IN AN ORGANIZED HEALTH CARE EDUCATION/TRAINING PROGRAM
Payer: MEDICAID

## 2023-07-09 PROBLEM — O47.9 UTERINE CONTRACTIONS: Status: ACTIVE | Noted: 2023-07-09

## 2023-07-09 LAB
ABO + RH BLD: NORMAL
APPEARANCE UR: CLEAR
BASOPHILS # BLD: 0 K/UL (ref 0–0.1)
BASOPHILS NFR BLD: 0 % (ref 0–2)
BILIRUB UR QL: NEGATIVE
BLOOD GROUP ANTIBODIES SERPL: NORMAL
COLOR UR: YELLOW
DIFFERENTIAL METHOD BLD: ABNORMAL
EOSINOPHIL # BLD: 0.1 K/UL (ref 0–0.4)
EOSINOPHIL NFR BLD: 0 % (ref 0–5)
ERYTHROCYTE [DISTWIDTH] IN BLOOD BY AUTOMATED COUNT: 18.8 % (ref 11.6–14.5)
GLUCOSE UR QL STRIP.AUTO: NEGATIVE MG/DL
HCT VFR BLD AUTO: 32.1 % (ref 35–45)
HGB BLD-MCNC: 10.9 G/DL (ref 12–16)
IMM GRANULOCYTES # BLD AUTO: 0.2 K/UL (ref 0–0.04)
IMM GRANULOCYTES NFR BLD AUTO: 1 % (ref 0–0.5)
KETONES UR-MCNC: NEGATIVE MG/DL
LEUKOCYTE ESTERASE UR QL STRIP: NEGATIVE
LYMPHOCYTES # BLD: 1.7 K/UL (ref 0.9–3.6)
LYMPHOCYTES NFR BLD: 15 % (ref 21–52)
MCH RBC QN AUTO: 23.6 PG (ref 24–34)
MCHC RBC AUTO-ENTMCNC: 34 G/DL (ref 31–37)
MCV RBC AUTO: 69.5 FL (ref 78–100)
MONOCYTES # BLD: 0.7 K/UL (ref 0.05–1.2)
MONOCYTES NFR BLD: 7 % (ref 3–10)
NEUTS SEG # BLD: 8.5 K/UL (ref 1.8–8)
NEUTS SEG NFR BLD: 77 % (ref 40–73)
NITRITE UR QL: NEGATIVE
NRBC # BLD: 0 K/UL (ref 0–0.01)
NRBC BLD-RTO: 0 PER 100 WBC
PH UR: 7 (ref 5–9)
PLATELET # BLD AUTO: 286 K/UL (ref 135–420)
PMV BLD AUTO: 11 FL (ref 9.2–11.8)
PROT UR QL: ABNORMAL MG/DL
RBC # BLD AUTO: 4.62 M/UL (ref 4.2–5.3)
RBC # UR STRIP: NEGATIVE
SERVICE CMNT-IMP: ABNORMAL
SP GR UR: 1.02 (ref 1–1.02)
SPECIMEN EXP DATE BLD: NORMAL
UROBILINOGEN UR QL: 0.2 EU/DL (ref 0.2–1)
WBC # BLD AUTO: 11.1 K/UL (ref 4.6–13.2)

## 2023-07-09 PROCEDURE — 85025 COMPLETE CBC W/AUTO DIFF WBC: CPT

## 2023-07-09 PROCEDURE — 86901 BLOOD TYPING SEROLOGIC RH(D): CPT

## 2023-07-09 PROCEDURE — 4A1HXCZ MONITORING OF PRODUCTS OF CONCEPTION, CARDIAC RATE, EXTERNAL APPROACH: ICD-10-PCS | Performed by: STUDENT IN AN ORGANIZED HEALTH CARE EDUCATION/TRAINING PROGRAM

## 2023-07-09 PROCEDURE — 86850 RBC ANTIBODY SCREEN: CPT

## 2023-07-09 PROCEDURE — 86900 BLOOD TYPING SEROLOGIC ABO: CPT

## 2023-07-09 PROCEDURE — 81003 URINALYSIS AUTO W/O SCOPE: CPT

## 2023-07-09 PROCEDURE — 7210000100 HC LABOR FEE PER 1 HR

## 2023-07-09 PROCEDURE — 99282 EMERGENCY DEPT VISIT SF MDM: CPT

## 2023-07-09 PROCEDURE — 1100000000 HC RM PRIVATE

## 2023-07-09 RX ORDER — SODIUM CHLORIDE 0.9 % (FLUSH) 0.9 %
5-40 SYRINGE (ML) INJECTION EVERY 12 HOURS SCHEDULED
Status: DISCONTINUED | OUTPATIENT
Start: 2023-07-09 | End: 2023-07-10 | Stop reason: HOSPADM

## 2023-07-09 RX ORDER — SODIUM CHLORIDE, SODIUM LACTATE, POTASSIUM CHLORIDE, AND CALCIUM CHLORIDE .6; .31; .03; .02 G/100ML; G/100ML; G/100ML; G/100ML
500 INJECTION, SOLUTION INTRAVENOUS PRN
Status: DISCONTINUED | OUTPATIENT
Start: 2023-07-09 | End: 2023-07-10 | Stop reason: HOSPADM

## 2023-07-09 RX ORDER — SODIUM CHLORIDE, SODIUM LACTATE, POTASSIUM CHLORIDE, AND CALCIUM CHLORIDE .6; .31; .03; .02 G/100ML; G/100ML; G/100ML; G/100ML
1000 INJECTION, SOLUTION INTRAVENOUS PRN
Status: DISCONTINUED | OUTPATIENT
Start: 2023-07-09 | End: 2023-07-10 | Stop reason: HOSPADM

## 2023-07-09 RX ORDER — SODIUM CHLORIDE, SODIUM LACTATE, POTASSIUM CHLORIDE, AND CALCIUM CHLORIDE .6; .31; .03; .02 G/100ML; G/100ML; G/100ML; G/100ML
500 INJECTION, SOLUTION INTRAVENOUS ONCE
Status: DISCONTINUED | OUTPATIENT
Start: 2023-07-09 | End: 2023-07-10 | Stop reason: HOSPADM

## 2023-07-09 RX ORDER — MISOPROSTOL 200 UG/1
800 TABLET ORAL PRN
Status: DISCONTINUED | OUTPATIENT
Start: 2023-07-09 | End: 2023-07-10 | Stop reason: HOSPADM

## 2023-07-09 RX ORDER — TRANEXAMIC ACID 10 MG/ML
1000 INJECTION, SOLUTION INTRAVENOUS
Status: DISCONTINUED | OUTPATIENT
Start: 2023-07-09 | End: 2023-07-10 | Stop reason: HOSPADM

## 2023-07-09 RX ORDER — METHYLERGONOVINE MALEATE 0.2 MG/ML
200 INJECTION INTRAVENOUS PRN
Status: DISCONTINUED | OUTPATIENT
Start: 2023-07-09 | End: 2023-07-10 | Stop reason: HOSPADM

## 2023-07-09 RX ORDER — HYDROMORPHONE HYDROCHLORIDE 2 MG/1
1 TABLET ORAL EVERY 4 HOURS PRN
Status: DISCONTINUED | OUTPATIENT
Start: 2023-07-09 | End: 2023-07-10 | Stop reason: HOSPADM

## 2023-07-09 RX ORDER — ACETAMINOPHEN 325 MG/1
650 TABLET ORAL EVERY 4 HOURS PRN
Status: DISCONTINUED | OUTPATIENT
Start: 2023-07-09 | End: 2023-07-10 | Stop reason: HOSPADM

## 2023-07-09 RX ORDER — TERBUTALINE SULFATE 1 MG/ML
0.25 INJECTION, SOLUTION SUBCUTANEOUS
Status: DISCONTINUED | OUTPATIENT
Start: 2023-07-09 | End: 2023-07-10 | Stop reason: HOSPADM

## 2023-07-09 RX ORDER — CARBOPROST TROMETHAMINE 250 UG/ML
250 INJECTION, SOLUTION INTRAMUSCULAR PRN
Status: DISCONTINUED | OUTPATIENT
Start: 2023-07-09 | End: 2023-07-10 | Stop reason: HOSPADM

## 2023-07-09 RX ORDER — SODIUM CHLORIDE 9 MG/ML
INJECTION, SOLUTION INTRAVENOUS PRN
Status: DISCONTINUED | OUTPATIENT
Start: 2023-07-09 | End: 2023-07-10 | Stop reason: HOSPADM

## 2023-07-09 RX ORDER — LIDOCAINE HYDROCHLORIDE 10 MG/ML
30 INJECTION, SOLUTION EPIDURAL; INFILTRATION; INTRACAUDAL; PERINEURAL PRN
Status: DISCONTINUED | OUTPATIENT
Start: 2023-07-09 | End: 2023-07-12 | Stop reason: HOSPADM

## 2023-07-09 RX ORDER — ONDANSETRON 2 MG/ML
4 INJECTION INTRAMUSCULAR; INTRAVENOUS EVERY 6 HOURS PRN
Status: DISCONTINUED | OUTPATIENT
Start: 2023-07-09 | End: 2023-07-10 | Stop reason: HOSPADM

## 2023-07-09 RX ORDER — SODIUM CHLORIDE 0.9 % (FLUSH) 0.9 %
5-40 SYRINGE (ML) INJECTION PRN
Status: DISCONTINUED | OUTPATIENT
Start: 2023-07-09 | End: 2023-07-10 | Stop reason: HOSPADM

## 2023-07-09 RX ORDER — HYDROMORPHONE HYDROCHLORIDE 1 MG/ML
1 INJECTION, SOLUTION INTRAMUSCULAR; INTRAVENOUS; SUBCUTANEOUS EVERY 4 HOURS PRN
Status: DISCONTINUED | OUTPATIENT
Start: 2023-07-09 | End: 2023-07-10

## 2023-07-09 NOTE — PROGRESS NOTES
Informed  that cervical exam performed by AMADNO Livingston RN was 7cm cervix was still posterior.  MD states to recheck in one hour

## 2023-07-10 PROBLEM — O99.013 ANEMIA DURING PREGNANCY IN THIRD TRIMESTER: Status: ACTIVE | Noted: 2023-07-10

## 2023-07-10 PROBLEM — Z3A.39 39 WEEKS GESTATION OF PREGNANCY: Status: ACTIVE | Noted: 2023-07-10

## 2023-07-10 PROBLEM — O09.30 LIMITED PRENATAL CARE: Status: ACTIVE | Noted: 2023-07-10

## 2023-07-10 PROCEDURE — 7220000101 HC DELIVERY VAGINAL/SINGLE

## 2023-07-10 PROCEDURE — 88307 TISSUE EXAM BY PATHOLOGIST: CPT

## 2023-07-10 PROCEDURE — 6370000000 HC RX 637 (ALT 250 FOR IP): Performed by: STUDENT IN AN ORGANIZED HEALTH CARE EDUCATION/TRAINING PROGRAM

## 2023-07-10 PROCEDURE — 7210000100 HC LABOR FEE PER 1 HR

## 2023-07-10 PROCEDURE — 6370000000 HC RX 637 (ALT 250 FOR IP): Performed by: OBSTETRICS & GYNECOLOGY

## 2023-07-10 PROCEDURE — 1100000000 HC RM PRIVATE

## 2023-07-10 PROCEDURE — 3700000156 HC EPIDURAL ANESTHESIA

## 2023-07-10 PROCEDURE — 6360000002 HC RX W HCPCS: Performed by: STUDENT IN AN ORGANIZED HEALTH CARE EDUCATION/TRAINING PROGRAM

## 2023-07-10 RX ORDER — BISACODYL 10 MG
10 SUPPOSITORY, RECTAL RECTAL DAILY PRN
Status: DISCONTINUED | OUTPATIENT
Start: 2023-07-10 | End: 2023-07-12 | Stop reason: HOSPADM

## 2023-07-10 RX ORDER — SODIUM CHLORIDE 0.9 % (FLUSH) 0.9 %
5-40 SYRINGE (ML) INJECTION EVERY 12 HOURS SCHEDULED
Status: DISCONTINUED | OUTPATIENT
Start: 2023-07-10 | End: 2023-07-12 | Stop reason: HOSPADM

## 2023-07-10 RX ORDER — LANOLIN/MINERAL OIL
LOTION (ML) TOPICAL PRN
Status: DISCONTINUED | OUTPATIENT
Start: 2023-07-10 | End: 2023-07-12 | Stop reason: HOSPADM

## 2023-07-10 RX ORDER — ONDANSETRON 2 MG/ML
4 INJECTION INTRAMUSCULAR; INTRAVENOUS EVERY 6 HOURS PRN
Status: DISCONTINUED | OUTPATIENT
Start: 2023-07-10 | End: 2023-07-12 | Stop reason: HOSPADM

## 2023-07-10 RX ORDER — MISOPROSTOL 200 UG/1
600 TABLET ORAL PRN
Status: DISCONTINUED | OUTPATIENT
Start: 2023-07-10 | End: 2023-07-12 | Stop reason: HOSPADM

## 2023-07-10 RX ORDER — MAGNESIUM CARB/ALUMINUM HYDROX 105-160MG
30 TABLET,CHEWABLE ORAL DAILY PRN
Status: DISCONTINUED | OUTPATIENT
Start: 2023-07-10 | End: 2023-07-12 | Stop reason: HOSPADM

## 2023-07-10 RX ORDER — DOCUSATE SODIUM 100 MG/1
100 CAPSULE, LIQUID FILLED ORAL 2 TIMES DAILY
Status: DISCONTINUED | OUTPATIENT
Start: 2023-07-10 | End: 2023-07-12 | Stop reason: HOSPADM

## 2023-07-10 RX ORDER — SIMETHICONE 80 MG
80 TABLET,CHEWABLE ORAL EVERY 6 HOURS PRN
Status: DISCONTINUED | OUTPATIENT
Start: 2023-07-10 | End: 2023-07-12 | Stop reason: HOSPADM

## 2023-07-10 RX ORDER — LIDOCAINE HYDROCHLORIDE 10 MG/ML
30 INJECTION, SOLUTION EPIDURAL; INFILTRATION; INTRACAUDAL; PERINEURAL ONCE
Status: DISCONTINUED | OUTPATIENT
Start: 2023-07-10 | End: 2023-07-12 | Stop reason: HOSPADM

## 2023-07-10 RX ORDER — SODIUM CHLORIDE 0.9 % (FLUSH) 0.9 %
5-40 SYRINGE (ML) INJECTION PRN
Status: DISCONTINUED | OUTPATIENT
Start: 2023-07-10 | End: 2023-07-12 | Stop reason: HOSPADM

## 2023-07-10 RX ORDER — SENNA AND DOCUSATE SODIUM 50; 8.6 MG/1; MG/1
1 TABLET, FILM COATED ORAL DAILY PRN
Status: DISCONTINUED | OUTPATIENT
Start: 2023-07-10 | End: 2023-07-12 | Stop reason: HOSPADM

## 2023-07-10 RX ORDER — SODIUM CHLORIDE 9 MG/ML
INJECTION, SOLUTION INTRAVENOUS PRN
Status: DISCONTINUED | OUTPATIENT
Start: 2023-07-10 | End: 2023-07-12 | Stop reason: HOSPADM

## 2023-07-10 RX ORDER — METHYLERGONOVINE MALEATE 0.2 MG/ML
200 INJECTION INTRAVENOUS PRN
Status: DISCONTINUED | OUTPATIENT
Start: 2023-07-10 | End: 2023-07-12 | Stop reason: HOSPADM

## 2023-07-10 RX ORDER — FERROUS SULFATE 325(65) MG
325 TABLET ORAL
Status: DISCONTINUED | OUTPATIENT
Start: 2023-07-10 | End: 2023-07-11 | Stop reason: SDUPTHER

## 2023-07-10 RX ORDER — CARBOPROST TROMETHAMINE 250 UG/ML
250 INJECTION, SOLUTION INTRAMUSCULAR PRN
Status: DISCONTINUED | OUTPATIENT
Start: 2023-07-10 | End: 2023-07-12 | Stop reason: HOSPADM

## 2023-07-10 RX ORDER — FENTANYL CITRATE 50 UG/ML
50 INJECTION, SOLUTION INTRAMUSCULAR; INTRAVENOUS ONCE
Status: DISCONTINUED | OUTPATIENT
Start: 2023-07-10 | End: 2023-07-10

## 2023-07-10 RX ORDER — SODIUM CHLORIDE, SODIUM LACTATE, POTASSIUM CHLORIDE, CALCIUM CHLORIDE 600; 310; 30; 20 MG/100ML; MG/100ML; MG/100ML; MG/100ML
INJECTION, SOLUTION INTRAVENOUS CONTINUOUS
Status: DISCONTINUED | OUTPATIENT
Start: 2023-07-10 | End: 2023-07-12 | Stop reason: HOSPADM

## 2023-07-10 RX ORDER — MISOPROSTOL 200 UG/1
800 TABLET ORAL PRN
Status: DISCONTINUED | OUTPATIENT
Start: 2023-07-10 | End: 2023-07-12 | Stop reason: HOSPADM

## 2023-07-10 RX ORDER — IBUPROFEN 400 MG/1
800 TABLET ORAL EVERY 8 HOURS PRN
Status: DISCONTINUED | OUTPATIENT
Start: 2023-07-10 | End: 2023-07-12 | Stop reason: HOSPADM

## 2023-07-10 RX ORDER — ACETAMINOPHEN 500 MG
1000 TABLET ORAL EVERY 6 HOURS PRN
Status: DISCONTINUED | OUTPATIENT
Start: 2023-07-10 | End: 2023-07-12 | Stop reason: HOSPADM

## 2023-07-10 RX ADMIN — IBUPROFEN 800 MG: 400 TABLET, FILM COATED ORAL at 04:54

## 2023-07-10 RX ADMIN — DOCUSATE SODIUM 100 MG: 100 CAPSULE, LIQUID FILLED ORAL at 08:34

## 2023-07-10 RX ADMIN — ACETAMINOPHEN 1000 MG: 500 TABLET ORAL at 11:35

## 2023-07-10 RX ADMIN — Medication 87.3 MILLI-UNITS/MIN: at 02:13

## 2023-07-10 RX ADMIN — IBUPROFEN 800 MG: 400 TABLET, FILM COATED ORAL at 15:13

## 2023-07-10 RX ADMIN — FERROUS SULFATE TAB 325 MG (65 MG ELEMENTAL FE) 325 MG: 325 (65 FE) TAB at 08:33

## 2023-07-10 ASSESSMENT — PAIN SCALES - GENERAL
PAINLEVEL_OUTOF10: 4
PAINLEVEL_OUTOF10: 2
PAINLEVEL_OUTOF10: 8
PAINLEVEL_OUTOF10: 6

## 2023-07-10 ASSESSMENT — PAIN DESCRIPTION - DESCRIPTORS
DESCRIPTORS: ACHING;CRAMPING
DESCRIPTORS: CRAMPING
DESCRIPTORS: CRAMPING

## 2023-07-10 ASSESSMENT — PAIN DESCRIPTION - ORIENTATION
ORIENTATION: LOWER

## 2023-07-10 ASSESSMENT — PAIN DESCRIPTION - LOCATION
LOCATION: ABDOMEN

## 2023-07-10 ASSESSMENT — PAIN - FUNCTIONAL ASSESSMENT
PAIN_FUNCTIONAL_ASSESSMENT: ACTIVITIES ARE NOT PREVENTED
PAIN_FUNCTIONAL_ASSESSMENT: ACTIVITIES ARE NOT PREVENTED

## 2023-07-10 NOTE — PLAN OF CARE
Problem: Pain  Goal: Verbalizes/displays adequate comfort level or baseline comfort level  7/10/2023 09 by PAU Heaton  Outcome: Progressing  Flowsheets (Taken 7/10/2023 0840)  Verbalizes/displays adequate comfort level or baseline comfort level:   Encourage patient to monitor pain and request assistance   Assess pain using appropriate pain scale  7/10/2023 0610 by Kelley Colunga RN  Outcome: Progressing  Flowsheets (Taken 7/10/2023 0456)  Verbalizes/displays adequate comfort level or baseline comfort level:   Encourage patient to monitor pain and request assistance   Assess pain using appropriate pain scale   Implement non-pharmacological measures as appropriate and evaluate response   Administer analgesics based on type and severity of pain and evaluate response   Notify Licensed Independent Practitioner if interventions unsuccessful or patient reports new pain   Consider cultural and social influences on pain and pain management  2023 by Braeden Flood RN  Outcome: Progressing     Problem: Postpartum  Goal: Experiences normal postpartum course  Description:  Postpartum OB-Pregnancy care plan goal which identifies if the mother is experiencing a normal postpartum course  7/10/2023 0900 by PAU Heaton  Outcome: Progressing  7/10/2023 0610 by Kelley Colunga RN  Outcome: Progressing  Goal: Appropriate maternal -  bonding  Description:  Postpartum OB-Pregnancy care plan goal which identifies if the mother and  are bonding appropriately  7/10/2023 0900 by PAU Heaton  Outcome: Progressing  7/10/2023 0610 by Kelley Colunga RN  Outcome: Progressing  Goal: Establishment of infant feeding pattern  Description:  Postpartum OB-Pregnancy care plan goal which identifies if the mother is establishing a feeding pattern with their   7/10/2023 0900 by PAU Heaton  Outcome: Progressing  7/10/2023 0610 by Kelley Colunga RN  Outcome: Progressing  Goal: Incisions,

## 2023-07-10 NOTE — PLAN OF CARE
Problem: Pain  Goal: Verbalizes/displays adequate comfort level or baseline comfort level  7/10/2023 0610 by Jacob Martinez RN  Outcome: Progressing  Flowsheets (Taken 7/10/2023 9563)  Verbalizes/displays adequate comfort level or baseline comfort level:   Encourage patient to monitor pain and request assistance   Assess pain using appropriate pain scale   Implement non-pharmacological measures as appropriate and evaluate response   Administer analgesics based on type and severity of pain and evaluate response   Notify Licensed Independent Practitioner if interventions unsuccessful or patient reports new pain   Consider cultural and social influences on pain and pain management  2023 6817 by Alice Washington RN  Outcome: Progressing     Problem: Postpartum  Goal: Experiences normal postpartum course  Description:  Postpartum OB-Pregnancy care plan goal which identifies if the mother is experiencing a normal postpartum course  Outcome: Progressing  Goal: Appropriate maternal -  bonding  Description:  Postpartum OB-Pregnancy care plan goal which identifies if the mother and  are bonding appropriately  Outcome: Progressing  Goal: Establishment of infant feeding pattern  Description:  Postpartum OB-Pregnancy care plan goal which identifies if the mother is establishing a feeding pattern with their   Outcome: Progressing  Goal: Incisions, wounds, or drain sites healing without S/S of infection  Outcome: Progressing     Problem: Infection - Adult  Goal: Absence of infection at discharge  Outcome: Progressing  Goal: Absence of infection during hospitalization  Outcome: Progressing  Goal: Absence of fever/infection during anticipated neutropenic period  Outcome: Progressing     Problem: Safety - Adult  Goal: Free from fall injury  Outcome: Progressing     Problem: Discharge Planning  Goal: Discharge to home or other facility with appropriate resources  Outcome: Progressing     Problem: Chronic

## 2023-07-10 NOTE — PROGRESS NOTES
Received to L & D unit with c/o contractions. . Assisted to triage #1.     7900 Fetal heart rate assessed using Doppler. Auscultated fetal heart rate during contraction and 30 seconds following contraction. No decelerations auscultated at this time. Mother reports good fetal movement at this time.

## 2023-07-10 NOTE — LACTATION NOTE
This note was copied from a baby's chart.     07/10/23 1000   Visit Information   Lactation Consult Visit Type IP Initial Consult   Visit Length 15 minutes   Referral Received From Referred by MD   Reason for Visit Education;Normal  Visit   Breast Feeding History/Assessment   Breastfeeding History Yes   Longest duration (#) 7   Longest Duration months   Complications No     Mom educated on breastfeeding basics--hunger cues, feeding on demand, waking baby if baby sleeps too long between feeds, importance of skin to skin, positioning and latching, risk of pacifier use and supplemental feedings, and importance of rooming in--and use of log sheet. Mom also educated on benefits of breastfeeding for herself and baby. Mom verbalized understanding. No questions at this time. Per mom, infant latching and nursing well. Normal DOL behaviors were discussed. Will remain available.

## 2023-07-10 NOTE — L&D DELIVERY NOTE
Vaginal delivery note:  Called to patient room as she was feeling the urge to push. She wanted to push on hands and knees, and was found to be c/c/+1. Continued pushing efforts brought baby down to c/c/+3. Mother turned to back/side. Baby's head delivered easily in vertex presentation, ARMINDA position. Anterior shoulder delivered, torso delivered with some difficulty (maternal effort). Hips delivered easily. Terminal meconium noted. Cord clamped and cut after 1 minute. Placenta delivered easily and intact. Perineum intact. Fundus firm. Hemostasis noted. Mom and baby bonding in stable condition. VFI, Apgars 8/8    EBL 100cc    Gabriel Lezama MD  [facility]  ) [phone]  Page through [facility], Baby Girl Mariajose Cheung [[id number]]      Labor Events      Cervical Ripening Date/Time:        Rupture Date/Time:                    Delivery Details      Delivery Date: 7/10/23 Delivery Time: 02:06:00                 Cord                  Placenta    Date/Time: 7/10/2023 02:12:00  Removal: Spontaneous  Appearance: Intact       Lacerations    Episiotomy: None  Perineal Lacerations: None  Other Lacerations: no non-perineal laceration       Blood Loss  Mother: Lady Salazar #[id number]     Start of Mother's Information      Delivery Blood Loss  23 1406 - 07/10/23 0233      None                 End of Mother's Information  Mother: Lady Salazar #[id number]                Delivery Providers    Delivering clinician:               Assessment    Living Status: Living        Skin Color:   Heart Rate:   Reflex Irritability:   Muscle Tone:   Respiratory Effort:    Total:            1 Minute:    0    2    2    2    2    8         5 Minute:    0    2    2    2    2    8                                        Apgars Assigned Anders Campo RN              Resuscitation    Method: Bulb Suction, Stimulation             Katy Measurements               Skin to Skin      Skin to Skin Initiation Date/Time: 7/10/23

## 2023-07-11 PROBLEM — O47.9 UTERINE CONTRACTIONS: Status: RESOLVED | Noted: 2023-07-09 | Resolved: 2023-07-11

## 2023-07-11 PROBLEM — O99.013 ANEMIA DURING PREGNANCY IN THIRD TRIMESTER: Status: RESOLVED | Noted: 2023-07-10 | Resolved: 2023-07-11

## 2023-07-11 PROBLEM — Z3A.39 39 WEEKS GESTATION OF PREGNANCY: Status: RESOLVED | Noted: 2023-07-10 | Resolved: 2023-07-11

## 2023-07-11 LAB
HCT VFR BLD AUTO: 28.3 % (ref 35–45)
HGB BLD-MCNC: 9.3 G/DL (ref 12–16)

## 2023-07-11 PROCEDURE — 85014 HEMATOCRIT: CPT

## 2023-07-11 PROCEDURE — 6370000000 HC RX 637 (ALT 250 FOR IP): Performed by: STUDENT IN AN ORGANIZED HEALTH CARE EDUCATION/TRAINING PROGRAM

## 2023-07-11 PROCEDURE — 36415 COLL VENOUS BLD VENIPUNCTURE: CPT

## 2023-07-11 PROCEDURE — 85018 HEMOGLOBIN: CPT

## 2023-07-11 PROCEDURE — 6370000000 HC RX 637 (ALT 250 FOR IP): Performed by: OBSTETRICS & GYNECOLOGY

## 2023-07-11 PROCEDURE — 1100000000 HC RM PRIVATE

## 2023-07-11 RX ORDER — FERROUS SULFATE 325(65) MG
325 TABLET ORAL EVERY OTHER DAY
Status: DISCONTINUED | OUTPATIENT
Start: 2023-07-11 | End: 2023-07-12 | Stop reason: HOSPADM

## 2023-07-11 RX ORDER — IBUPROFEN 800 MG/1
800 TABLET ORAL EVERY 8 HOURS PRN
Qty: 30 TABLET | Refills: 1 | Status: SHIPPED | OUTPATIENT
Start: 2023-07-11

## 2023-07-11 RX ORDER — FERROUS SULFATE 325(65) MG
325 TABLET ORAL EVERY OTHER DAY
Qty: 30 TABLET | Refills: 3 | Status: SHIPPED | OUTPATIENT
Start: 2023-07-11

## 2023-07-11 RX ADMIN — IBUPROFEN 800 MG: 400 TABLET, FILM COATED ORAL at 03:58

## 2023-07-11 RX ADMIN — FERROUS SULFATE TAB 325 MG (65 MG ELEMENTAL FE) 325 MG: 325 (65 FE) TAB at 08:47

## 2023-07-11 RX ADMIN — IBUPROFEN 800 MG: 400 TABLET, FILM COATED ORAL at 14:21

## 2023-07-11 RX ADMIN — IBUPROFEN 800 MG: 400 TABLET, FILM COATED ORAL at 22:07

## 2023-07-11 RX ADMIN — ACETAMINOPHEN 1000 MG: 500 TABLET ORAL at 03:58

## 2023-07-11 RX ADMIN — DOCUSATE SODIUM 100 MG: 100 CAPSULE, LIQUID FILLED ORAL at 08:47

## 2023-07-11 ASSESSMENT — PAIN DESCRIPTION - DESCRIPTORS
DESCRIPTORS: CRAMPING
DESCRIPTORS: CRAMPING
DESCRIPTORS: ACHING;CRAMPING

## 2023-07-11 ASSESSMENT — PAIN SCALES - GENERAL
PAINLEVEL_OUTOF10: 6
PAINLEVEL_OUTOF10: 6
PAINLEVEL_OUTOF10: 5
PAINLEVEL_OUTOF10: 5
PAINLEVEL_OUTOF10: 7

## 2023-07-11 ASSESSMENT — PAIN DESCRIPTION - ORIENTATION
ORIENTATION: LOWER
ORIENTATION: LOWER

## 2023-07-11 ASSESSMENT — PAIN DESCRIPTION - LOCATION
LOCATION: ABDOMEN

## 2023-07-11 NOTE — DISCHARGE SUMMARY
Obstetrical Discharge Summary     Name: Jose Menendez MRN: [id number]  SSN: [id number]    YOB: 2000  Age: 25 y.o. Sex: female      Admit Date: 2023    Discharge Date: 2023    Admitting Physician: Henny Goodman MD     Attending Physician:  Henny Goodman MD     Discharge Diagnoses: s/p  female infant, anemia of chronic and acute loss, expected. 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     Disposition: home    Discharge Condition: Stable    Patient Instructions:   Current Discharge Medication List        START taking these medications    Details   ferrous sulfate (IRON 325) 325 (65 Fe) MG tablet Take 1 tablet by mouth every other day  Qty: 30 tablet, Refills: 3      ibuprofen (ADVIL;MOTRIN) 800 MG tablet Take 1 tablet by mouth every 8 hours as needed for Pain  Qty: 30 tablet, Refills: 1           CONTINUE these medications which have NOT CHANGED    Details   Prenat w/o F-YM-Qmjezhl-FA-DHA (PNV-DHA) 27-0.6-0.4-300 MG CAPS Take by mouth           STOP taking these medications       diphenhydrAMINE (BENADRYL) 25 MG capsule Comments:   Reason for Stopping:         norethindrone (MICRONOR) 0.35 MG tablet Comments:   Reason for

## 2023-07-11 NOTE — PLAN OF CARE
Problem: Pain  Goal: Verbalizes/displays adequate comfort level or baseline comfort level  2023 by PAU Heaton  Outcome: Progressing  7/10/2023 2338 by Ashley Casiano RN  Outcome: Progressing  Flowsheets (Taken 7/10/2023 2148)  Verbalizes/displays adequate comfort level or baseline comfort level:   Encourage patient to monitor pain and request assistance   Assess pain using appropriate pain scale   Administer analgesics based on type and severity of pain and evaluate response   Implement non-pharmacological measures as appropriate and evaluate response   Consider cultural and social influences on pain and pain management   Notify Licensed Independent Practitioner if interventions unsuccessful or patient reports new pain     Problem: Postpartum  Goal: Experiences normal postpartum course  Description:  Postpartum OB-Pregnancy care plan goal which identifies if the mother is experiencing a normal postpartum course  2023 by PAU Heaton  Outcome: Progressing  7/10/2023 2338 by Ashley Casiano RN  Outcome: Progressing  Goal: Appropriate maternal -  bonding  Description:  Postpartum OB-Pregnancy care plan goal which identifies if the mother and  are bonding appropriately  2023 by PAU Heaton  Outcome: Progressing  7/10/2023 2338 by Ashley Casiano RN  Outcome: Progressing  Goal: Establishment of infant feeding pattern  Description:  Postpartum OB-Pregnancy care plan goal which identifies if the mother is establishing a feeding pattern with their   2023 by PAU Heaton  Outcome: Progressing  7/10/2023 2338 by Ashley Casiano RN  Outcome: Progressing  Goal: Incisions, wounds, or drain sites healing without S/S of infection  2023 by PAU Heaton  Outcome: Progressing  7/10/2023 2338 by Ashley Casiano RN  Outcome: Progressing     Problem: Infection - Adult  Goal: Absence of infection at discharge  2023 by

## 2023-07-11 NOTE — PLAN OF CARE
Problem: Pain  Goal: Verbalizes/displays adequate comfort level or baseline comfort level  Outcome: Progressing  Flowsheets (Taken 7/10/2023 2148)  Verbalizes/displays adequate comfort level or baseline comfort level:   Encourage patient to monitor pain and request assistance   Assess pain using appropriate pain scale   Administer analgesics based on type and severity of pain and evaluate response   Implement non-pharmacological measures as appropriate and evaluate response   Consider cultural and social influences on pain and pain management   Notify Licensed Independent Practitioner if interventions unsuccessful or patient reports new pain     Problem: Postpartum  Goal: Experiences normal postpartum course  Description:  Postpartum OB-Pregnancy care plan goal which identifies if the mother is experiencing a normal postpartum course  Outcome: Progressing  Goal: Appropriate maternal -  bonding  Description:  Postpartum OB-Pregnancy care plan goal which identifies if the mother and  are bonding appropriately  Outcome: Progressing  Goal: Establishment of infant feeding pattern  Description:  Postpartum OB-Pregnancy care plan goal which identifies if the mother is establishing a feeding pattern with their   Outcome: Progressing  Goal: Incisions, wounds, or drain sites healing without S/S of infection  Outcome: Progressing     Problem: Infection - Adult  Goal: Absence of infection at discharge  Outcome: Progressing  Goal: Absence of infection during hospitalization  Outcome: Progressing  Goal: Absence of fever/infection during anticipated neutropenic period  Outcome: Progressing     Problem: Safety - Adult  Goal: Free from fall injury  Outcome: Progressing     Problem: Discharge Planning  Goal: Discharge to home or other facility with appropriate resources  Outcome: Progressing     Problem: Chronic Conditions and Co-morbidities  Goal: Patient's chronic conditions and co-morbidity symptoms are monitored and maintained or improved  Outcome: Progressing

## 2023-07-11 NOTE — PROGRESS NOTES
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 Continue routine postpartum and perineal care as well as maternal education. 2. Discharge tomorrow. Information for the patient's :  Matt Rodriguez [[id number]]   Vaginal, Spontaneous  Pt was having some dizziness when getting up yesterday  Feels better today. Would like to stay until tomorrow. Voiding without difficulty, normal lochia.     Current Facility-Administered Medications   Medication Dose Route Frequency Provider Last Rate Last Admin    oxytocin (PITOCIN) 30 units in 500 mL infusion  87.3 anne marie-units/min IntraVENous Continuous PRN Vannesa Rees APRN - CNM        And    oxytocin (PITOCIN) 10 unit bolus from the bag  10 Units IntraVENous PRN Vannesa Rees APRN - CNM        oxytocin (PITOCIN) 30 units in 500 mL infusion  87.3 anne marie-units/min IntraVENous Continuous Gabriel Lezama MD 87.3 mL/hr at 07/10/23 0213 87.3 anne marie-units/min at 07/10/23 1215    mineral oil liquid 30 mL  30 mL Oral Daily PRN Gabriel Lezama MD        lidocaine PF 1 % injection 30 mL  30 mL IntraDERmal Once Gabriel Lezama MD        lactated ringers IV soln infusion   IntraVENous Continuous Gabriel Lezama MD        sodium chloride flush 0.9 % injection 5-40 mL  5-40 mL IntraVENous 2 times per day Gabriel Lezama MD        sodium chloride flush 0.9 % injection 5-40 mL  5-40 mL IntraVENous PRN Gabriel Lezama MD        0.9 % sodium chloride infusion   IntraVENous PRN Gabriel Lezama MD        carboprost

## 2023-07-11 NOTE — LACTATION NOTE
This note was copied from a baby's chart.     07/11/23 0079   Visit Information   Lactation Consult Visit Type IP Consult Follow Up   Visit Length 15 minutes   Referral Received From Lactation Consultant Follow-up   Reason for Visit Education     Per mom, infant latching and nursing well but soreness. Discussed latch and normal expectations. Mom has begun using a breast pump, per her request. Discussed normal expectations regarding pumping. Lactation discharge education completed. Mom verbalized understanding. Will remain available.

## 2023-07-12 VITALS
HEART RATE: 89 BPM | SYSTOLIC BLOOD PRESSURE: 129 MMHG | RESPIRATION RATE: 16 BRPM | OXYGEN SATURATION: 100 % | DIASTOLIC BLOOD PRESSURE: 82 MMHG | TEMPERATURE: 97.5 F

## 2023-07-12 PROCEDURE — 6370000000 HC RX 637 (ALT 250 FOR IP): Performed by: OBSTETRICS & GYNECOLOGY

## 2023-07-12 PROCEDURE — 6370000000 HC RX 637 (ALT 250 FOR IP): Performed by: STUDENT IN AN ORGANIZED HEALTH CARE EDUCATION/TRAINING PROGRAM

## 2023-07-12 RX ADMIN — DOCUSATE SODIUM 100 MG: 100 CAPSULE, LIQUID FILLED ORAL at 09:33

## 2023-07-12 RX ADMIN — IBUPROFEN 800 MG: 400 TABLET, FILM COATED ORAL at 09:33

## 2023-07-12 ASSESSMENT — PAIN DESCRIPTION - ORIENTATION: ORIENTATION: LOWER

## 2023-07-12 ASSESSMENT — PAIN DESCRIPTION - DESCRIPTORS: DESCRIPTORS: CRAMPING

## 2023-07-12 ASSESSMENT — PAIN - FUNCTIONAL ASSESSMENT: PAIN_FUNCTIONAL_ASSESSMENT: ACTIVITIES ARE NOT PREVENTED

## 2023-07-12 ASSESSMENT — PAIN SCALES - GENERAL: PAINLEVEL_OUTOF10: 6

## 2023-07-12 ASSESSMENT — PAIN DESCRIPTION - LOCATION: LOCATION: ABDOMEN

## 2023-07-12 NOTE — PLAN OF CARE
Problem: Pain  Goal: Verbalizes/displays adequate comfort level or baseline comfort level  2023 by Roosevelt Byrne RN  Outcome: Progressing  2023 by Tova Harden RN  Outcome: Progressing     Problem: Postpartum  Goal: Experiences normal postpartum course  Description:  Postpartum OB-Pregnancy care plan goal which identifies if the mother is experiencing a normal postpartum course  2023 by Tova Harden RN  Outcome: Progressing  Goal: Appropriate maternal -  bonding  Description:  Postpartum OB-Pregnancy care plan goal which identifies if the mother and  are bonding appropriately  2023 by Tova Harden RN  Outcome: Progressing  Goal: Establishment of infant feeding pattern  Description:  Postpartum OB-Pregnancy care plan goal which identifies if the mother is establishing a feeding pattern with their   2023 by Tova Harden RN  Outcome: Progressing  Goal: Incisions, wounds, or drain sites healing without S/S of infection  2023 by Tova Harden RN  Outcome: Progressing     Problem: Infection - Adult  Goal: Absence of infection at discharge  2023 by Tova Harden RN  Outcome: Progressing  Goal: Absence of infection during hospitalization  2023 by Tova Harden RN  Outcome: Progressing  Goal: Absence of fever/infection during anticipated neutropenic period  2023 by Tova Harden RN  Outcome: Progressing     Problem: Safety - Adult  Goal: Free from fall injury  2023 by Tova Harden RN  Outcome: Progressing     Problem: Discharge Planning  Goal: Discharge to home or other facility with appropriate resources  2023 by Tova Harden RN  Outcome: Progressing     Problem: Chronic Conditions and Co-morbidities  Goal: Patient's chronic conditions and co-morbidity symptoms are monitored and maintained or improved  2023 by Tova Harden RN  Outcome: Progressing

## 2023-07-12 NOTE — PLAN OF CARE
Problem: Pain  Goal: Verbalizes/displays adequate comfort level or baseline comfort level  2023 1038 by Yris wOens RN  Outcome: Progressing  2023 0237 by Ochoa Branch RN  Outcome: Progressing  Flowsheets (Taken 2023 0015)  Verbalizes/displays adequate comfort level or baseline comfort level:   Encourage patient to monitor pain and request assistance   Assess pain using appropriate pain scale   Administer analgesics based on type and severity of pain and evaluate response   Implement non-pharmacological measures as appropriate and evaluate response   Notify Licensed Independent Practitioner if interventions unsuccessful or patient reports new pain   Consider cultural and social influences on pain and pain management     Problem: Postpartum  Goal: Experiences normal postpartum course  Description:  Postpartum OB-Pregnancy care plan goal which identifies if the mother is experiencing a normal postpartum course  2023 1038 by Yris Owens RN  Outcome: Progressing  2023 0237 by Ochoa Branch RN  Outcome: Progressing  Goal: Appropriate maternal -  bonding  Description:  Postpartum OB-Pregnancy care plan goal which identifies if the mother and  are bonding appropriately  2023 1038 by Yris Owens RN  Outcome: Progressing  2023 0237 by Ochoa Branch RN  Outcome: Progressing  Goal: Establishment of infant feeding pattern  Description:  Postpartum OB-Pregnancy care plan goal which identifies if the mother is establishing a feeding pattern with their   2023 1038 by Yris Owens RN  Outcome: Progressing  2023 0237 by Ochoa Branch RN  Outcome: Progressing  Goal: Incisions, wounds, or drain sites healing without S/S of infection  2023 1038 by Yris Owens RN  Outcome: Progressing  Flowsheets (Taken 2023 0933)  Incisions, Wounds, or Drain Sites Healing Without Sign and Symptoms of Infection: TWICE DAILY: Assess and

## 2023-07-12 NOTE — DISCHARGE INSTRUCTIONS
POST DELIVERY DISCHARGE INSTRUCTIONS    Name: Nakita Pitts  YOB: 2000  Primary Diagnosis: [unfilled]    General:     Diet/Diet Restrictions:  Eight 8-ounce glasses of fluid daily (water, juices); avoid excessive caffeine intake. Meals/snacks as desired which are high in fiber and carbohydrates and low in fat and cholesterol. Physical Activity / Restrictions / Safety:     Avoid heavy lifting, no more that 8 lbs. For 2-3 weeks; limit use of stairs to 2 times daily for the first week home. No driving for one week. Avoid intercourse 4-6 weeks, no douching or tampon use. Check with obstetrician before starting or resuming an exercise program.         Discharge Instructions/Special Treatment/Home Care Needs:     Continue prenatal vitamins. Continue to use squirt bottle with warm water on your episiotomy after each bathroom use until bleeding stops. If steri-strips applied to your incision, remove in 7-10 days. Call your doctor for the following:     Fever over 101 degrees by mouth. Vaginal bleeding heavier than a normal menstrual period or clot larger than a golf ball. Red streaks or increased swelling of legs, painful red streaks on your breast.  Painful urination, constipation and increased pain or swelling or discharge with your incision. If you feel extremely anxious or overwhelmed. If you have thoughts of harming yourself and/or your baby. Pain Management:     Pain Management:   Take Acetaminophen (Tylenol) or Ibuprofen (Advil, Motrin), as directed for pain. Use a warm Sitz bath 3 times daily to relieve episiotomy or hemorrhoidal discomfort. Heating pad to  incision as needed. For hemorrhoidal discomfort, use Tucks and Anusol cream as needed and directed. Follow-Up Care:      These are general instructions for a healthy lifestyle:    No smoking/ No tobacco products/ Avoid exposure to second hand smoke    Surgeon General's Warning:  Quitting smoking now greatly reduces serious risk to your health. Obesity, smoking, and sedentary lifestyle greatly increases your risk for illness    A healthy diet, regular physical exercise & weight monitoring are important for maintaining a healthy lifestyle    Recognize signs and symptoms of STROKE:    F-face looks uneven    A-arms unable to move or move unevenly    S-speech slurred or non-existent    T-time-call 911 as soon as signs and symptoms begin-DO NOT go       Back to bed or wait to see if you get better-TIME IS BRAIN.

## 2023-07-12 NOTE — PLAN OF CARE
Problem: Pain  Goal: Verbalizes/displays adequate comfort level or baseline comfort level  2023 0237 by Chino Garcia RN  Outcome: Progressing  Flowsheets (Taken 2023 0015)  Verbalizes/displays adequate comfort level or baseline comfort level:   Encourage patient to monitor pain and request assistance   Assess pain using appropriate pain scale   Administer analgesics based on type and severity of pain and evaluate response   Implement non-pharmacological measures as appropriate and evaluate response   Notify Licensed Independent Practitioner if interventions unsuccessful or patient reports new pain   Consider cultural and social influences on pain and pain management  2023 2010 by Jordyn Chavis RN  Outcome: Progressing     Problem: Postpartum  Goal: Experiences normal postpartum course  Description:  Postpartum OB-Pregnancy care plan goal which identifies if the mother is experiencing a normal postpartum course  Outcome: Progressing  Goal: Appropriate maternal -  bonding  Description:  Postpartum OB-Pregnancy care plan goal which identifies if the mother and  are bonding appropriately  Outcome: Progressing  Goal: Establishment of infant feeding pattern  Description:  Postpartum OB-Pregnancy care plan goal which identifies if the mother is establishing a feeding pattern with their   Outcome: Progressing  Goal: Incisions, wounds, or drain sites healing without S/S of infection  Outcome: Progressing     Problem: Infection - Adult  Goal: Absence of infection at discharge  Outcome: Progressing  Goal: Absence of infection during hospitalization  Outcome: Progressing  Goal: Absence of fever/infection during anticipated neutropenic period  Outcome: Progressing     Problem: Safety - Adult  Goal: Free from fall injury  Outcome: Progressing     Problem: Discharge Planning  Goal: Discharge to home or other facility with appropriate resources  Outcome: Progressing     Problem: Chronic Conditions and Co-morbidities  Goal: Patient's chronic conditions and co-morbidity symptoms are monitored and maintained or improved  Outcome: Progressing

## 2023-07-12 NOTE — PROGRESS NOTES
Discharge instructions reviewed verbally, pt e-signed receiving printed version and verbalized understanding. Pt encouraged several times throughout shift to complete [employer] form, form uncompleted prior to pt discharging.

## 2023-07-12 NOTE — PROGRESS NOTES
1600 Received care of mother in room, no distress, call bell in reach, encouraged ambulation in hallways this pm,  family @ bedside,